# Patient Record
Sex: FEMALE | Race: OTHER | HISPANIC OR LATINO
[De-identification: names, ages, dates, MRNs, and addresses within clinical notes are randomized per-mention and may not be internally consistent; named-entity substitution may affect disease eponyms.]

---

## 2017-01-05 ENCOUNTER — APPOINTMENT (OUTPATIENT)
Dept: SURGERY | Facility: CLINIC | Age: 72
End: 2017-01-05

## 2017-01-05 VITALS
BODY MASS INDEX: 29.37 KG/M2 | DIASTOLIC BLOOD PRESSURE: 82 MMHG | HEIGHT: 64 IN | SYSTOLIC BLOOD PRESSURE: 132 MMHG | HEART RATE: 66 BPM | WEIGHT: 172 LBS

## 2017-01-05 DIAGNOSIS — Z78.9 OTHER SPECIFIED HEALTH STATUS: ICD-10-CM

## 2017-01-05 DIAGNOSIS — Z86.79 PERSONAL HISTORY OF OTHER DISEASES OF THE CIRCULATORY SYSTEM: ICD-10-CM

## 2018-06-12 ENCOUNTER — INPATIENT (INPATIENT)
Facility: HOSPITAL | Age: 73
LOS: 2 days | Discharge: HOME | End: 2018-06-15
Attending: INTERNAL MEDICINE | Admitting: INTERNAL MEDICINE
Payer: MEDICAID

## 2018-06-12 VITALS
OXYGEN SATURATION: 100 % | DIASTOLIC BLOOD PRESSURE: 91 MMHG | HEART RATE: 61 BPM | RESPIRATION RATE: 18 BRPM | SYSTOLIC BLOOD PRESSURE: 183 MMHG | TEMPERATURE: 98 F

## 2018-06-12 DIAGNOSIS — I21.4 NON-ST ELEVATION (NSTEMI) MYOCARDIAL INFARCTION: ICD-10-CM

## 2018-06-12 DIAGNOSIS — Z98.89 OTHER SPECIFIED POSTPROCEDURAL STATES: Chronic | ICD-10-CM

## 2018-06-12 LAB
ALBUMIN SERPL ELPH-MCNC: 4.4 G/DL — SIGNIFICANT CHANGE UP (ref 3.3–5)
ALP SERPL-CCNC: 154 U/L — HIGH (ref 40–120)
ALT FLD-CCNC: 26 U/L — SIGNIFICANT CHANGE UP (ref 4–33)
APTT BLD: 34.5 SEC — SIGNIFICANT CHANGE UP (ref 27.5–37.4)
AST SERPL-CCNC: 96 U/L — HIGH (ref 4–32)
BASE EXCESS BLDV CALC-SCNC: 2.4 MMOL/L — SIGNIFICANT CHANGE UP
BASOPHILS # BLD AUTO: 0.01 K/UL — SIGNIFICANT CHANGE UP (ref 0–0.2)
BASOPHILS NFR BLD AUTO: 0.1 % — SIGNIFICANT CHANGE UP (ref 0–2)
BILIRUB SERPL-MCNC: 0.6 MG/DL — SIGNIFICANT CHANGE UP (ref 0.2–1.2)
BLD GP AB SCN SERPL QL: NEGATIVE — SIGNIFICANT CHANGE UP
BLOOD GAS VENOUS - CREATININE: 0.63 MG/DL — SIGNIFICANT CHANGE UP (ref 0.5–1.3)
BUN SERPL-MCNC: 9 MG/DL — SIGNIFICANT CHANGE UP (ref 7–23)
CALCIUM SERPL-MCNC: 10 MG/DL — SIGNIFICANT CHANGE UP (ref 8.4–10.5)
CHLORIDE BLDV-SCNC: 95 MMOL/L — LOW (ref 96–108)
CHLORIDE SERPL-SCNC: 84 MMOL/L — LOW (ref 98–107)
CK MB BLD-MCNC: 10.5 — HIGH (ref 0–2.5)
CK MB BLD-MCNC: 97.35 NG/ML — HIGH (ref 1–4.7)
CK SERPL-CCNC: 926 U/L — HIGH (ref 25–170)
CO2 SERPL-SCNC: 26 MMOL/L — SIGNIFICANT CHANGE UP (ref 22–31)
CREAT SERPL-MCNC: 0.61 MG/DL — SIGNIFICANT CHANGE UP (ref 0.5–1.3)
EOSINOPHIL # BLD AUTO: 0 K/UL — SIGNIFICANT CHANGE UP (ref 0–0.5)
EOSINOPHIL NFR BLD AUTO: 0 % — SIGNIFICANT CHANGE UP (ref 0–6)
GAS PNL BLDV: 125 MMOL/L — LOW (ref 136–146)
GLUCOSE BLDC GLUCOMTR-MCNC: 133 MG/DL — HIGH (ref 70–99)
GLUCOSE BLDV-MCNC: 170 — HIGH (ref 70–99)
GLUCOSE SERPL-MCNC: 170 MG/DL — HIGH (ref 70–99)
HBA1C BLD-MCNC: 6 % — HIGH (ref 4–5.6)
HCO3 BLDV-SCNC: 26 MMOL/L — SIGNIFICANT CHANGE UP (ref 20–27)
HCT VFR BLD CALC: 40 % — SIGNIFICANT CHANGE UP (ref 34.5–45)
HCT VFR BLD CALC: 40.9 % — SIGNIFICANT CHANGE UP (ref 34.5–45)
HCT VFR BLDV CALC: 41.5 % — SIGNIFICANT CHANGE UP (ref 34.5–45)
HGB BLD-MCNC: 13.2 G/DL — SIGNIFICANT CHANGE UP (ref 11.5–15.5)
HGB BLD-MCNC: 13.4 G/DL — SIGNIFICANT CHANGE UP (ref 11.5–15.5)
HGB BLDV-MCNC: 13.5 G/DL — SIGNIFICANT CHANGE UP (ref 11.5–15.5)
IMM GRANULOCYTES # BLD AUTO: 0.04 # — SIGNIFICANT CHANGE UP
IMM GRANULOCYTES NFR BLD AUTO: 0.4 % — SIGNIFICANT CHANGE UP (ref 0–1.5)
INR BLD: 1.16 — SIGNIFICANT CHANGE UP (ref 0.88–1.17)
LACTATE BLDV-MCNC: 2.5 MMOL/L — HIGH (ref 0.5–2)
LIDOCAIN IGE QN: 41.4 U/L — SIGNIFICANT CHANGE UP (ref 7–60)
LYMPHOCYTES # BLD AUTO: 0.83 K/UL — LOW (ref 1–3.3)
LYMPHOCYTES # BLD AUTO: 7.7 % — LOW (ref 13–44)
MCHC RBC-ENTMCNC: 26.3 PG — LOW (ref 27–34)
MCHC RBC-ENTMCNC: 26.7 PG — LOW (ref 27–34)
MCHC RBC-ENTMCNC: 32.8 % — SIGNIFICANT CHANGE UP (ref 32–36)
MCHC RBC-ENTMCNC: 33 % — SIGNIFICANT CHANGE UP (ref 32–36)
MCV RBC AUTO: 80.2 FL — SIGNIFICANT CHANGE UP (ref 80–100)
MCV RBC AUTO: 80.8 FL — SIGNIFICANT CHANGE UP (ref 80–100)
MONOCYTES # BLD AUTO: 0.18 K/UL — SIGNIFICANT CHANGE UP (ref 0–0.9)
MONOCYTES NFR BLD AUTO: 1.7 % — LOW (ref 2–14)
NEUTROPHILS # BLD AUTO: 9.77 K/UL — HIGH (ref 1.8–7.4)
NEUTROPHILS NFR BLD AUTO: 90.1 % — HIGH (ref 43–77)
NRBC # FLD: 0 — SIGNIFICANT CHANGE UP
NRBC # FLD: 0 — SIGNIFICANT CHANGE UP
PCO2 BLDV: 39 MMHG — LOW (ref 41–51)
PH BLDV: 7.45 PH — HIGH (ref 7.32–7.43)
PLATELET # BLD AUTO: 315 K/UL — SIGNIFICANT CHANGE UP (ref 150–400)
PLATELET # BLD AUTO: 376 K/UL — SIGNIFICANT CHANGE UP (ref 150–400)
PMV BLD: 10.7 FL — SIGNIFICANT CHANGE UP (ref 7–13)
PMV BLD: 12.1 FL — SIGNIFICANT CHANGE UP (ref 7–13)
PO2 BLDV: 39 MMHG — SIGNIFICANT CHANGE UP (ref 35–40)
POTASSIUM BLDV-SCNC: 3.2 MMOL/L — LOW (ref 3.4–4.5)
POTASSIUM SERPL-MCNC: 4.9 MMOL/L — SIGNIFICANT CHANGE UP (ref 3.5–5.3)
POTASSIUM SERPL-SCNC: 4.9 MMOL/L — SIGNIFICANT CHANGE UP (ref 3.5–5.3)
PROT SERPL-MCNC: 9.3 G/DL — HIGH (ref 6–8.3)
PROTHROM AB SERPL-ACNC: 12.9 SEC — SIGNIFICANT CHANGE UP (ref 9.8–13.1)
RBC # BLD: 4.95 M/UL — SIGNIFICANT CHANGE UP (ref 3.8–5.2)
RBC # BLD: 5.1 M/UL — SIGNIFICANT CHANGE UP (ref 3.8–5.2)
RBC # FLD: 14 % — SIGNIFICANT CHANGE UP (ref 10.3–14.5)
RBC # FLD: 14.3 % — SIGNIFICANT CHANGE UP (ref 10.3–14.5)
RH IG SCN BLD-IMP: POSITIVE — SIGNIFICANT CHANGE UP
SAO2 % BLDV: 71.3 % — SIGNIFICANT CHANGE UP (ref 60–85)
SODIUM SERPL-SCNC: 127 MMOL/L — LOW (ref 135–145)
TROPONIN T SERPL-MCNC: 1.13 NG/ML — HIGH (ref 0–0.06)
TROPONIN T SERPL-MCNC: 1.38 NG/ML — HIGH (ref 0–0.06)
WBC # BLD: 10.83 K/UL — HIGH (ref 3.8–10.5)
WBC # BLD: 16.59 K/UL — HIGH (ref 3.8–10.5)
WBC # FLD AUTO: 10.83 K/UL — HIGH (ref 3.8–10.5)
WBC # FLD AUTO: 16.59 K/UL — HIGH (ref 3.8–10.5)

## 2018-06-12 PROCEDURE — 99222 1ST HOSP IP/OBS MODERATE 55: CPT

## 2018-06-12 PROCEDURE — 92941 PRQ TRLML REVSC TOT OCCL AMI: CPT | Mod: LD

## 2018-06-12 PROCEDURE — 92978 ENDOLUMINL IVUS OCT C 1ST: CPT | Mod: 26,LD

## 2018-06-12 PROCEDURE — 93458 L HRT ARTERY/VENTRICLE ANGIO: CPT | Mod: 26,59

## 2018-06-12 RX ORDER — CLOPIDOGREL BISULFATE 75 MG/1
75 TABLET, FILM COATED ORAL ONCE
Qty: 0 | Refills: 0 | Status: COMPLETED | OUTPATIENT
Start: 2018-06-12 | End: 2018-06-12

## 2018-06-12 RX ORDER — PANTOPRAZOLE SODIUM 20 MG/1
40 TABLET, DELAYED RELEASE ORAL ONCE
Qty: 0 | Refills: 0 | Status: COMPLETED | OUTPATIENT
Start: 2018-06-12 | End: 2018-06-12

## 2018-06-12 RX ORDER — DEXTROSE 50 % IN WATER 50 %
15 SYRINGE (ML) INTRAVENOUS ONCE
Qty: 0 | Refills: 0 | Status: DISCONTINUED | OUTPATIENT
Start: 2018-06-12 | End: 2018-06-13

## 2018-06-12 RX ORDER — CHLORHEXIDINE GLUCONATE 213 G/1000ML
1 SOLUTION TOPICAL
Qty: 0 | Refills: 0 | Status: DISCONTINUED | OUTPATIENT
Start: 2018-06-12 | End: 2018-06-15

## 2018-06-12 RX ORDER — ONDANSETRON 8 MG/1
4 TABLET, FILM COATED ORAL ONCE
Qty: 0 | Refills: 0 | Status: COMPLETED | OUTPATIENT
Start: 2018-06-12 | End: 2018-06-12

## 2018-06-12 RX ORDER — HEPARIN SODIUM 5000 [USP'U]/ML
INJECTION INTRAVENOUS; SUBCUTANEOUS
Qty: 25000 | Refills: 0 | Status: DISCONTINUED | OUTPATIENT
Start: 2018-06-12 | End: 2018-06-12

## 2018-06-12 RX ORDER — SODIUM CHLORIDE 9 MG/ML
1000 INJECTION, SOLUTION INTRAVENOUS
Qty: 0 | Refills: 0 | Status: DISCONTINUED | OUTPATIENT
Start: 2018-06-12 | End: 2018-06-13

## 2018-06-12 RX ORDER — NITROGLYCERIN 6.5 MG
1 CAPSULE, EXTENDED RELEASE ORAL ONCE
Qty: 0 | Refills: 0 | Status: DISCONTINUED | OUTPATIENT
Start: 2018-06-12 | End: 2018-06-12

## 2018-06-12 RX ORDER — GLUCAGON INJECTION, SOLUTION 0.5 MG/.1ML
1 INJECTION, SOLUTION SUBCUTANEOUS ONCE
Qty: 0 | Refills: 0 | Status: DISCONTINUED | OUTPATIENT
Start: 2018-06-12 | End: 2018-06-13

## 2018-06-12 RX ORDER — NITROGLYCERIN 6.5 MG
2 CAPSULE, EXTENDED RELEASE ORAL ONCE
Qty: 0 | Refills: 0 | Status: COMPLETED | OUTPATIENT
Start: 2018-06-12 | End: 2018-06-12

## 2018-06-12 RX ORDER — DEXTROSE 50 % IN WATER 50 %
25 SYRINGE (ML) INTRAVENOUS ONCE
Qty: 0 | Refills: 0 | Status: DISCONTINUED | OUTPATIENT
Start: 2018-06-12 | End: 2018-06-13

## 2018-06-12 RX ORDER — SODIUM CHLORIDE 9 MG/ML
1000 INJECTION INTRAMUSCULAR; INTRAVENOUS; SUBCUTANEOUS ONCE
Qty: 0 | Refills: 0 | Status: COMPLETED | OUTPATIENT
Start: 2018-06-12 | End: 2018-06-12

## 2018-06-12 RX ORDER — DEXTROSE 50 % IN WATER 50 %
12.5 SYRINGE (ML) INTRAVENOUS ONCE
Qty: 0 | Refills: 0 | Status: DISCONTINUED | OUTPATIENT
Start: 2018-06-12 | End: 2018-06-13

## 2018-06-12 RX ORDER — MORPHINE SULFATE 50 MG/1
2 CAPSULE, EXTENDED RELEASE ORAL ONCE
Qty: 0 | Refills: 0 | Status: DISCONTINUED | OUTPATIENT
Start: 2018-06-12 | End: 2018-06-12

## 2018-06-12 RX ORDER — NITROGLYCERIN 6.5 MG
0.4 CAPSULE, EXTENDED RELEASE ORAL ONCE
Qty: 0 | Refills: 0 | Status: DISCONTINUED | OUTPATIENT
Start: 2018-06-12 | End: 2018-06-12

## 2018-06-12 RX ORDER — SODIUM CHLORIDE 9 MG/ML
3 INJECTION INTRAMUSCULAR; INTRAVENOUS; SUBCUTANEOUS ONCE
Qty: 0 | Refills: 0 | Status: COMPLETED | OUTPATIENT
Start: 2018-06-12 | End: 2018-06-12

## 2018-06-12 RX ORDER — CLOPIDOGREL BISULFATE 75 MG/1
225 TABLET, FILM COATED ORAL ONCE
Qty: 0 | Refills: 0 | Status: COMPLETED | OUTPATIENT
Start: 2018-06-12 | End: 2018-06-12

## 2018-06-12 RX ORDER — HEPARIN SODIUM 5000 [USP'U]/ML
4000 INJECTION INTRAVENOUS; SUBCUTANEOUS EVERY 6 HOURS
Qty: 0 | Refills: 0 | Status: DISCONTINUED | OUTPATIENT
Start: 2018-06-12 | End: 2018-06-12

## 2018-06-12 RX ORDER — TICAGRELOR 90 MG/1
90 TABLET ORAL
Qty: 0 | Refills: 0 | Status: DISCONTINUED | OUTPATIENT
Start: 2018-06-12 | End: 2018-06-15

## 2018-06-12 RX ORDER — HYDRALAZINE HCL 50 MG
10 TABLET ORAL ONCE
Qty: 0 | Refills: 0 | Status: COMPLETED | OUTPATIENT
Start: 2018-06-12 | End: 2018-06-12

## 2018-06-12 RX ORDER — PANTOPRAZOLE SODIUM 20 MG/1
40 TABLET, DELAYED RELEASE ORAL
Qty: 0 | Refills: 0 | Status: DISCONTINUED | OUTPATIENT
Start: 2018-06-13 | End: 2018-06-15

## 2018-06-12 RX ORDER — INSULIN LISPRO 100/ML
VIAL (ML) SUBCUTANEOUS
Qty: 0 | Refills: 0 | Status: DISCONTINUED | OUTPATIENT
Start: 2018-06-12 | End: 2018-06-13

## 2018-06-12 RX ORDER — INSULIN LISPRO 100/ML
VIAL (ML) SUBCUTANEOUS AT BEDTIME
Qty: 0 | Refills: 0 | Status: DISCONTINUED | OUTPATIENT
Start: 2018-06-12 | End: 2018-06-13

## 2018-06-12 RX ORDER — ATORVASTATIN CALCIUM 80 MG/1
80 TABLET, FILM COATED ORAL AT BEDTIME
Qty: 0 | Refills: 0 | Status: DISCONTINUED | OUTPATIENT
Start: 2018-06-12 | End: 2018-06-15

## 2018-06-12 RX ORDER — NITROGLYCERIN 6.5 MG
0.4 CAPSULE, EXTENDED RELEASE ORAL ONCE
Qty: 0 | Refills: 0 | Status: COMPLETED | OUTPATIENT
Start: 2018-06-12 | End: 2018-06-12

## 2018-06-12 RX ORDER — PANTOPRAZOLE SODIUM 20 MG/1
40 TABLET, DELAYED RELEASE ORAL
Qty: 0 | Refills: 0 | Status: DISCONTINUED | OUTPATIENT
Start: 2018-06-12 | End: 2018-06-12

## 2018-06-12 RX ORDER — CLOPIDOGREL BISULFATE 75 MG/1
300 TABLET, FILM COATED ORAL ONCE
Qty: 0 | Refills: 0 | Status: COMPLETED | OUTPATIENT
Start: 2018-06-12 | End: 2018-06-12

## 2018-06-12 RX ORDER — HEPARIN SODIUM 5000 [USP'U]/ML
4000 INJECTION INTRAVENOUS; SUBCUTANEOUS ONCE
Qty: 0 | Refills: 0 | Status: COMPLETED | OUTPATIENT
Start: 2018-06-12 | End: 2018-06-12

## 2018-06-12 RX ORDER — HYDRALAZINE HCL 50 MG
25 TABLET ORAL ONCE
Qty: 0 | Refills: 0 | Status: COMPLETED | OUTPATIENT
Start: 2018-06-12 | End: 2018-06-12

## 2018-06-12 RX ORDER — CAPTOPRIL 12.5 MG/1
6.25 TABLET ORAL THREE TIMES A DAY
Qty: 0 | Refills: 0 | Status: DISCONTINUED | OUTPATIENT
Start: 2018-06-12 | End: 2018-06-14

## 2018-06-12 RX ADMIN — Medication 2 INCH(S): at 12:26

## 2018-06-12 RX ADMIN — ATORVASTATIN CALCIUM 80 MILLIGRAM(S): 80 TABLET, FILM COATED ORAL at 22:19

## 2018-06-12 RX ADMIN — Medication 25 MILLIGRAM(S): at 14:36

## 2018-06-12 RX ADMIN — PANTOPRAZOLE SODIUM 40 MILLIGRAM(S): 20 TABLET, DELAYED RELEASE ORAL at 20:18

## 2018-06-12 RX ADMIN — CLOPIDOGREL BISULFATE 75 MILLIGRAM(S): 75 TABLET, FILM COATED ORAL at 11:08

## 2018-06-12 RX ADMIN — CAPTOPRIL 6.25 MILLIGRAM(S): 12.5 TABLET ORAL at 22:19

## 2018-06-12 RX ADMIN — SODIUM CHLORIDE 1000 MILLILITER(S): 9 INJECTION INTRAMUSCULAR; INTRAVENOUS; SUBCUTANEOUS at 10:31

## 2018-06-12 RX ADMIN — CLOPIDOGREL BISULFATE 300 MILLIGRAM(S): 75 TABLET, FILM COATED ORAL at 12:08

## 2018-06-12 RX ADMIN — CLOPIDOGREL BISULFATE 225 MILLIGRAM(S): 75 TABLET, FILM COATED ORAL at 11:18

## 2018-06-12 RX ADMIN — HEPARIN SODIUM 4000 UNIT(S): 5000 INJECTION INTRAVENOUS; SUBCUTANEOUS at 12:14

## 2018-06-12 RX ADMIN — Medication 0.4 MILLIGRAM(S): at 11:16

## 2018-06-12 RX ADMIN — Medication 10 MILLIGRAM(S): at 14:36

## 2018-06-12 RX ADMIN — HEPARIN SODIUM 800 UNIT(S)/HR: 5000 INJECTION INTRAVENOUS; SUBCUTANEOUS at 12:15

## 2018-06-12 RX ADMIN — ONDANSETRON 4 MILLIGRAM(S): 8 TABLET, FILM COATED ORAL at 10:31

## 2018-06-12 RX ADMIN — SODIUM CHLORIDE 3 MILLILITER(S): 9 INJECTION INTRAMUSCULAR; INTRAVENOUS; SUBCUTANEOUS at 10:31

## 2018-06-12 RX ADMIN — MORPHINE SULFATE 2 MILLIGRAM(S): 50 CAPSULE, EXTENDED RELEASE ORAL at 10:32

## 2018-06-12 RX ADMIN — MORPHINE SULFATE 2 MILLIGRAM(S): 50 CAPSULE, EXTENDED RELEASE ORAL at 10:48

## 2018-06-12 RX ADMIN — ONDANSETRON 4 MILLIGRAM(S): 8 TABLET, FILM COATED ORAL at 20:18

## 2018-06-12 NOTE — ED PROVIDER NOTE - PHYSICAL EXAMINATION
ATTENDING PHYSICAL EXAM DR. RASMUSSEN ***GEN - NAD; well appearing; A+O x3 ***HEAD - NC/AT ***EYES/NOSE - PERRL, EOMI, mucous membranes moist, no discharge ***THROAT: Oral cavity and pharynx normal. No inflammation, swelling, exudate, or lesions.  ***NECK: Neck supple, non-tender without lymphadenopathy, no masses, no thyromegaly.   ***PULMONARY - CTA b/l, symmetric breath sounds. ***CARDIAC -s1s2, RRR, no M,G,R  ***ABDOMEN - +BS, ND, epigastric pain, soft, no guarding, no rebound, no masses   ***BACK - no CVA tenderness, Normal  spine ***EXTREMITIES - symmetric pulses, 2+ dp, capillary refill < 2 seconds, no clubbing, no cyanosis, no edema ***SKIN - no rash or bruising   ***NEUROLOGIC – alert

## 2018-06-12 NOTE — ED ADULT NURSE NOTE - CHIEF COMPLAINT QUOTE
# [id number] Presents with c/o abdominal pain with vomiting since yesterday with weakness.  Diarrhea since last night.

## 2018-06-12 NOTE — CONSULT NOTE ADULT - SUBJECTIVE AND OBJECTIVE BOX
Date of Consult: 6/12/2018    CHIEF COMPLAINT:    HISTORY OF PRESENT ILLNESS:  Tabitha Tsai is a 72 year old woman with PMHx significant for HTN, lung resection for lung ca in distant past who presents with epigastric pain for multiple hours. This started off mild with chest pain, which then moved down to epigastric area. She denies any SOB at all during this and she has never had this previously. She recently came here from [place] 2 weeks ago and her/family is unclear of her follow up. However, family does state that patient's mother passed away from a heart attack. Patient still admits to having this sort of discomfort.     BP check in the ED with 220/110 with EDUARDO 1 mm in leads V1 and V2 with repolarization changes as well. Cardiology called for evaluation.     Allergies    aspirin (Stomach Upset)  atenolol (Unknown)    Intolerances: aspirin: anaphylaxis     MEDICATIONS:  heparin  Infusion.  Unit(s)/Hr IV Continuous <Continuous>  heparin  Injectable 4000 Unit(s) IV Push every 6 hours PRN    PAST MEDICAL & SURGICAL HISTORY:  Lung mass  HTN (hypertension)  History of lung surgery    FAMILY HISTORY:  No pertinent family history in first degree relatives    SOCIAL HISTORY:    [ ] Non-smoker  [ ] Smoker  [ ] Alcohol    REVIEW OF SYSTEMS:  See HPI. Otherwise, 10 point ROS done and otherwise negative.    PHYSICAL EXAM:  T(C): 36.2 (06-12-18 @ 12:00), Max: 36.9 (06-12-18 @ 08:58)  HR: 63 (06-12-18 @ 12:00) (61 - 70)  BP: 199/92 (06-12-18 @ 12:00) (183/91 - 222/95)  RR: 18 (06-12-18 @ 12:00) (18 - 21)  SpO2: 100% (06-12-18 @ 12:00) (100% - 100%)  Wt(kg): --  I&O's Summary    Appearance: Normal	  HEENT:   Normal oral mucosa, PERRL, EOMI	  Lymphatic: No lymphadenopathy  Cardiovascular: Normal S1 S2, No JVD, No murmurs, No edema  Respiratory: Lungs clear to auscultation	  Psychiatry: A & O x 3, Mood & affect appropriate  Gastrointestinal:  Soft, Non-tender, + BS	  Skin: No rashes, No ecchymoses, No cyanosis	  Neurologic: Non-focal  Extremities: Normal range of motion, No clubbing, cyanosis or edema  Vascular: Peripheral pulses palpable 2+ bilaterally    LABS:	 	    CBC Full  -  ( 12 Jun 2018 09:50 )  WBC Count : 10.83 K/uL  Hemoglobin : 13.2 g/dL  Hematocrit : 40.0 %  Platelet Count - Automated : 376 K/uL  Mean Cell Volume : 80.8 fL  Mean Cell Hemoglobin : 26.7 pg  Mean Cell Hemoglobin Concentration : 33.0 %  Auto Neutrophil # : 9.77 K/uL  Auto Lymphocyte # : 0.83 K/uL  Auto Monocyte # : 0.18 K/uL  Auto Eosinophil # : 0.00 K/uL  Auto Basophil # : 0.01 K/uL  Auto Neutrophil % : 90.1 %  Auto Lymphocyte % : 7.7 %  Auto Monocyte % : 1.7 %  Auto Eosinophil % : 0.0 %  Auto Basophil % : 0.1 %    06-12    127<L>  |  84<L>  |  9   ----------------------------<  170<H>  4.9   |  26  |  0.61    Ca    10.0      12 Jun 2018 09:50    TPro  9.3<H>  /  Alb  4.4  /  TBili  0.6  /  DBili  x   /  AST  96<H>  /  ALT  26  /  AlkPhos  154<H>  06-12    proBNP:   Lipid Profile:   HgA1c:   TSH:     CARDIAC MARKERS:  	    ECG: Normal sinus with EDUARDO in V1 and V2 1 mm with repolarization changes.     	  RADIOLOGY:  OTHER: 	    PREVIOUS DIAGNOSTIC TESTING:    [ ] Echocardiogram:  [ ]  Catheterization:  [ ] Stress Test:  	  	  ASSESSMENT/PLAN:

## 2018-06-12 NOTE — CHART NOTE - NSCHARTNOTEFT_GEN_A_CORE
Rt radial band d/makeda. No hematoma or bleeding noted .Rt radial pulse +2 with good capillary refills to all fingers.4x4 gauze dsg  applied. RN notified to monitor for bleeding and hematoma.

## 2018-06-12 NOTE — H&P ADULT - NSHPREVIEWOFSYSTEMS_GEN_ALL_CORE
REVIEW OF SYSTEMS:    CONSTITUTIONAL: + chills  EYES/ENT: No visual changes;  No vertigo or throat pain   NECK: No pain or stiffness  RESPIRATORY: No cough, wheezing, hemoptysis; No shortness of breath  CARDIOVASCULAR: + chest pain  GASTROINTESTINAL: +nausea  GENITOURINARY: No dysuria, frequency or hematuria  NEUROLOGICAL: No numbness or weakness  SKIN: No itching, burning, rashes, or lesions   All other review of systems is negative unless indicated above.

## 2018-06-12 NOTE — H&P ADULT - HISTORY OF PRESENT ILLNESS
Tabitha Tsai is a 72 year old woman with PMHx significant for HTN, lung resection for lung ca in distant past who presents with epigastric pain for multiple hours. This started off mild with chest pain, which then moved down to epigastric area. She denies any SOB at all during this and she has never had this previously. She recently came here from [place] 2 weeks ago and her/family is unclear of her follow up. However, family does state that patient's mother passed away from a heart attack. Patient still admits to having this sort of discomfort.     BP check in the ED with 220/110 with EDUARDO 1 mm in leads V1 and V2 with repolarization changes as well.  Taken to the cath lab 2 ALEK to D1, found to have 80% mid RCA. Tabitha Tsai is a 72 year old woman with PMHx significant for HTN, lung resection for lung ca in distant past who presents with epigastric pain for multiple hours. This started off mild with chest pain, which then moved down to epigastric area. She denies any SOB at all during this and she has never had this previously. She recently came here from [place] 2 weeks ago and her/family is unclear of her follow up. However, family does state that patient's mother passed away from a heart attack. Patient still admits to having this sort of discomfort.     BP check in the ED with 220/110 with EDUARDO? 1 mm in leads V1 and V2 with repolarization changes as well.  Taken to the cath lab 2 ALEK to D1, found to have 80% mid RCA.

## 2018-06-12 NOTE — ED ADULT NURSE NOTE - OBJECTIVE STATEMENT
Patient received to room 12, A&Ox3, ambulatory, respirations even and unlabored, skin warm and dry good for color, appears uncomfortable. Patient reports yesterday after 4 pm experienced, nausea, vomiting, diarrhea, chest and abdominal pain after drinking a watermelon milkshake, reports no improvement of symptoms. Patient states 4 soft and formed BS's with no blood in stool, unsure how many times vomited. At time, patient has not had an episode of emesis. Denies LOC, hematuria, dysuria. Hx HT, HDL, Lung CA on remission x 3 years. Left Ac 20g IV placed, labs drawn and sent. On cardiac monitor, SR. Family at bedside. Patient received to room 12, A&Ox3, ambulatory, respirations even and unlabored, skin warm and dry good for color, appears uncomfortable. Patient reports yesterday after 4 pm experienced, nausea, vomiting, diarrhea, chest and abdominal pain after drinking a watermelon milkshake, reports no improvement of symptoms. Patient states 4 soft and formed BS's with no blood in stool, unsure how many times vomited. At time, patient has not had an episode of emesis. Denies LOC, hematuria, dysuria. Hx HT, HDL, Lung CA on remission x 3 years. Left Ac 20g IV placed, labs drawn and sent. Family at bedside.

## 2018-06-12 NOTE — H&P ADULT - ASSESSMENT
72 year old woman with PMHx significant for HTN, lung resection for lung ca who presents with epigastric pain. Presentation is concerning for UA given EKG changes, trop elevation, elevated JEFF but potentially in setting hypertensive emergency which can also cause her repol changes. S/p Cath with 2 ALEK to D1, found to also have 80% occlusion mid RCA.  Admitted to CCU for monitoring post cath and aspirin desensitization.    #Neuro: no issues, mentating well    #Cards:  -s/p cath, on plavix and statin, will desensitize to aspirin in the morning.      #Endo:  Unclear hx of diabetes, will cover with ISS for now and f/u A1c in morning    #GI:  Patient complaining of epigastric pain and nausea, will CTM    #Heme  -HH stable currently, will monitor cath site for signs of bleeding    PPX: Holding off on DVt ppx status post cath 72 year old woman with PMHx significant for HTN, lung resection for lung ca who presents with epigastric pain. Presentation is concerning for UA given EKG changes, trop elevation, elevated JEFF but potentially in setting hypertensive emergency which can also cause her repol changes. S/p Cath with 2 ALEK to D1, found to also have 80% occlusion mid RCA.  Admitted to CCU for monitoring post cath and aspirin desensitization.    #Neuro: no issues, mentating well    #Cards:  -s/p cath, on plavix and statin, will desensitize to aspirin in the morning.      #Endo:  Unclear hx of diabetes, will cover with ISS for now and f/u A1c in morning    #GI:  Patient complaining of epigastric pain and nausea, will CTM  -zofran prn    #Heme  -HH stable currently, will monitor cath site for signs of bleeding    PPX: Holding off on DVt ppx status post cath 72 year old woman with PMHx significant for HTN, lung resection for lung ca who presents with epigastric pain. Presentation is concerning for UA given EKG changes, trop elevation, elevated JEFF but potentially in setting hypertensive emergency which can also cause her repol changes. S/p Cath with 2 ALEK to D1, found to also have 80% occlusion mid RCA.  Admitted to CCU for monitoring post cath and aspirin desensitization.    #Neuro: no issues, mentating well    #Cards:  -s/p cath, received plavix in ED, will continue on ticagrelor and statin, will desensitize to aspirin in the morning.    -if worsening abdominal pain low threshold to get repeat EKG, enzymes    HTN  -captopril 6.25 TID    #Endo:  Unclear hx of diabetes, will cover with ISS for now and f/u A1c in morning    #GI:  Patient complaining of epigastric pain and nausea, will CTM  -zofran prn    #Heme  -HH stable currently, will monitor cath site for signs of bleeding    PPX: Holding off on DVt ppx status post cath

## 2018-06-12 NOTE — ED PROVIDER NOTE - MEDICAL DECISION MAKING DETAILS
72 YOF PMH lung CA s/p resection, HTN, DM p/w chest pain and epigastric pain for the past 16 hours.  Associated NB NB emesis, chills, and diarrhea.  Exam reveals epigastric tenderness.  DDX obstruction, atypical ACS, GERD, gastritis, enteritis.  CT AP r/o obstruction, serial enzymes, troponin r/o ACS, lipase r/o pancreatitis, CXR evaluate for life threatening causes of CP, metabolic and hematologic evaluation for organ dysfunction, symptomatic treatment, resuscitation with IVF, treat symptomatically, reassess.

## 2018-06-12 NOTE — H&P ADULT - NSHPPHYSICALEXAM_GEN_ALL_CORE
General: WN/WD NAD  Neurology: A&Ox3, nonfocal, AMBRIZ x 4  Eyes: PERRLA/ EOMI, Gross vision intact  ENT/Neck: Neck supple, trachea midline, No JVD, Gross hearing intact  Respiratory: CTA B/L, No wheezing, rales, rhonchi  CV: RRR, S1S2, no murmurs, rubs or gallops  Abdominal: Soft, TTP in epigastric area, ND +BS, +  Extremities: No edema, + peripheral pulses  Skin: No Rashes, Hematoma, Ecchymosis  MSK: 5/5 strength

## 2018-06-12 NOTE — ED PROVIDER NOTE - OBJECTIVE STATEMENT
72 YOF PMH lung CA s/p resection, HTN, DM p/w chest pain and epigastric pain for the past 16 hours.  Associated NB NB emesis, chills, and diarrhea.  Pt at dinner complained of chest pain radiating to her epigastric area.  Pt then vomited.  No shortness of breath, no worsening symptoms with exertion, no improvement with rest, no diaphoresis, no headache.  No history of stress test, no angiogram, no echocardiogram, and no follow up with a cardiologist. 72 YOF PMH lung CA s/p resection, HTN, DM p/w chest pain and epigastric pain for the past 16 hours.  Associated NB NB emesis, chills, and diarrhea.  Pt at dinner complained of chest pain radiating to her epigastric area.  Pt then vomited.  No shortness of breath, no worsening symptoms with exertion, no improvement with rest, no diaphoresis, no headache.  No history of stress test, no angiogram, no echocardiogram, and no follow up with a cardiologist. ATTG NOTE DR. RASMUSSEN   Site/Location - epigastric pain  Intensity / Severity - 6/10  Quality / Character - sharp  Onset / Duration - yesterday 4p  Radiation - to back  Context - no SOB, no diaphoresis, non exertional, no palpitations, no recent travelling, no recent abx, no fever  Aggravating factors - none  Alleviating factors - none  Associated Signs and Symptoms - no fever, no chills, +nausea, +emesis multiple, +diarrhea non bloody multiple, no extremity pain

## 2018-06-12 NOTE — CONSULT NOTE ADULT - ASSESSMENT
72 year old woman with PMHx significant for HTN, lung resection for lung ca who presents with epigastric pain. Presentation is concerning for UA given EKG changes, trop elevation, elevated JEFF but potentially in setting hypertensive emergency which can also cause her repol changes.   -agree with clopidogrel load of 600 mg given intolerance to aspirin  -heparin gtt  -would bring down pressures with nitro gtt  -plan for cardiac catheterization to delineate her coronary anatomy   -will follow     Please call on call cardiology team at [phone] with any further questions.

## 2018-06-12 NOTE — ED ADULT TRIAGE NOTE - CHIEF COMPLAINT QUOTE
# [id number] Presents with c/o abdominal pain with vomiting since yesterday with weakness.  Diarrhea since last night.

## 2018-06-12 NOTE — ED PROVIDER NOTE - PROGRESS NOTE DETAILS
ATTG NOTE DR. RASMUSSEN Pt with chest pain last night, none since, currently chest pain free.  Will repeat EKG and call cardiology for consult. JW NSTEMI.  Positive trop.  PT CP fee.  EKG unchanged.  PT admitted for catheterization.  Discussed with cardiology fellow.

## 2018-06-12 NOTE — H&P ADULT - NSHPLABSRESULTS_GEN_ALL_CORE
13.2   10.83 )-----------( 376      ( 12 Jun 2018 09:50 )             40.0       06-12    127<L>  |  84<L>  |  9   ----------------------------<  170<H>  4.9   |  26  |  0.61    Ca    10.0      12 Jun 2018 09:50    TPro  9.3<H>  /  Alb  4.4  /  TBili  0.6  /  DBili  x   /  AST  96<H>  /  ALT  26  /  AlkPhos  154<H>  06-12                  PT/INR - ( 12 Jun 2018 10:06 )   PT: 12.9 SEC;   INR: 1.16          PTT - ( 12 Jun 2018 10:06 )  PTT:34.5 SEC    Lactate Trend      CARDIAC MARKERS ( 12 Jun 2018 09:50 )  x     / 1.13 ng/mL / x     / x     / x            CAPILLARY BLOOD GLUCOSE

## 2018-06-13 ENCOUNTER — TRANSCRIPTION ENCOUNTER (OUTPATIENT)
Age: 73
End: 2018-06-13

## 2018-06-13 DIAGNOSIS — R74.0 NONSPECIFIC ELEVATION OF LEVELS OF TRANSAMINASE AND LACTIC ACID DEHYDROGENASE [LDH]: ICD-10-CM

## 2018-06-13 DIAGNOSIS — I25.10 ATHEROSCLEROTIC HEART DISEASE OF NATIVE CORONARY ARTERY WITHOUT ANGINA PECTORIS: ICD-10-CM

## 2018-06-13 DIAGNOSIS — R10.13 EPIGASTRIC PAIN: ICD-10-CM

## 2018-06-13 DIAGNOSIS — R91.8 OTHER NONSPECIFIC ABNORMAL FINDING OF LUNG FIELD: ICD-10-CM

## 2018-06-13 DIAGNOSIS — I24.9 ACUTE ISCHEMIC HEART DISEASE, UNSPECIFIED: ICD-10-CM

## 2018-06-13 DIAGNOSIS — I10 ESSENTIAL (PRIMARY) HYPERTENSION: ICD-10-CM

## 2018-06-13 DIAGNOSIS — E87.1 HYPO-OSMOLALITY AND HYPONATREMIA: ICD-10-CM

## 2018-06-13 DIAGNOSIS — D72.829 ELEVATED WHITE BLOOD CELL COUNT, UNSPECIFIED: ICD-10-CM

## 2018-06-13 DIAGNOSIS — Z29.9 ENCOUNTER FOR PROPHYLACTIC MEASURES, UNSPECIFIED: ICD-10-CM

## 2018-06-13 LAB
ALBUMIN SERPL ELPH-MCNC: 3.6 G/DL — SIGNIFICANT CHANGE UP (ref 3.3–5)
ALP SERPL-CCNC: 131 U/L — HIGH (ref 40–120)
ALT FLD-CCNC: 27 U/L — SIGNIFICANT CHANGE UP (ref 4–33)
AST SERPL-CCNC: 113 U/L — HIGH (ref 4–32)
BASOPHILS # BLD AUTO: 0.03 K/UL — SIGNIFICANT CHANGE UP (ref 0–0.2)
BASOPHILS NFR BLD AUTO: 0.2 % — SIGNIFICANT CHANGE UP (ref 0–2)
BILIRUB SERPL-MCNC: 1.2 MG/DL — SIGNIFICANT CHANGE UP (ref 0.2–1.2)
BUN SERPL-MCNC: 10 MG/DL — SIGNIFICANT CHANGE UP (ref 7–23)
CALCIUM SERPL-MCNC: 9.3 MG/DL — SIGNIFICANT CHANGE UP (ref 8.4–10.5)
CHLORIDE SERPL-SCNC: 85 MMOL/L — LOW (ref 98–107)
CHOLEST SERPL-MCNC: 250 MG/DL — HIGH (ref 120–199)
CK MB BLD-MCNC: 59.49 NG/ML — HIGH (ref 1–4.7)
CK MB BLD-MCNC: 8.5 — HIGH (ref 0–2.5)
CK MB BLD-MCNC: 80.96 NG/ML — HIGH (ref 1–4.7)
CK SERPL-CCNC: 825 U/L — HIGH (ref 25–170)
CK SERPL-CCNC: 951 U/L — HIGH (ref 25–170)
CO2 SERPL-SCNC: 24 MMOL/L — SIGNIFICANT CHANGE UP (ref 22–31)
CREAT SERPL-MCNC: 0.65 MG/DL — SIGNIFICANT CHANGE UP (ref 0.5–1.3)
EOSINOPHIL # BLD AUTO: 0.01 K/UL — SIGNIFICANT CHANGE UP (ref 0–0.5)
EOSINOPHIL NFR BLD AUTO: 0.1 % — SIGNIFICANT CHANGE UP (ref 0–6)
GLUCOSE BLDC GLUCOMTR-MCNC: 139 MG/DL — HIGH (ref 70–99)
GLUCOSE SERPL-MCNC: 139 MG/DL — HIGH (ref 70–99)
HBA1C BLD-MCNC: 5.9 % — HIGH (ref 4–5.6)
HCT VFR BLD CALC: 38.7 % — SIGNIFICANT CHANGE UP (ref 34.5–45)
HCT VFR BLD CALC: 38.7 % — SIGNIFICANT CHANGE UP (ref 34.5–45)
HDLC SERPL-MCNC: 54 MG/DL — SIGNIFICANT CHANGE UP (ref 45–65)
HGB BLD-MCNC: 13.5 G/DL — SIGNIFICANT CHANGE UP (ref 11.5–15.5)
HGB BLD-MCNC: 13.5 G/DL — SIGNIFICANT CHANGE UP (ref 11.5–15.5)
IMM GRANULOCYTES # BLD AUTO: 0.07 # — SIGNIFICANT CHANGE UP
IMM GRANULOCYTES NFR BLD AUTO: 0.4 % — SIGNIFICANT CHANGE UP (ref 0–1.5)
LACTATE SERPL-SCNC: 3 MMOL/L — HIGH (ref 0.5–2)
LIPID PNL WITH DIRECT LDL SERPL: 192 MG/DL — SIGNIFICANT CHANGE UP
LYMPHOCYTES # BLD AUTO: 1.02 K/UL — SIGNIFICANT CHANGE UP (ref 1–3.3)
LYMPHOCYTES # BLD AUTO: 5.5 % — LOW (ref 13–44)
MAGNESIUM SERPL-MCNC: 1.7 MG/DL — SIGNIFICANT CHANGE UP (ref 1.6–2.6)
MCHC RBC-ENTMCNC: 26.3 PG — LOW (ref 27–34)
MCHC RBC-ENTMCNC: 26.3 PG — LOW (ref 27–34)
MCHC RBC-ENTMCNC: 34.9 % — SIGNIFICANT CHANGE UP (ref 32–36)
MCHC RBC-ENTMCNC: 34.9 % — SIGNIFICANT CHANGE UP (ref 32–36)
MCV RBC AUTO: 75.4 FL — LOW (ref 80–100)
MCV RBC AUTO: 75.4 FL — LOW (ref 80–100)
MONOCYTES # BLD AUTO: 1.28 K/UL — HIGH (ref 0–0.9)
MONOCYTES NFR BLD AUTO: 6.9 % — SIGNIFICANT CHANGE UP (ref 2–14)
NEUTROPHILS # BLD AUTO: 16.01 K/UL — HIGH (ref 1.8–7.4)
NEUTROPHILS NFR BLD AUTO: 86.9 % — HIGH (ref 43–77)
NRBC # FLD: 0 — SIGNIFICANT CHANGE UP
NRBC # FLD: 0 — SIGNIFICANT CHANGE UP
OSMOLALITY SERPL: 266 MOSMO/KG — LOW (ref 275–295)
OSMOLALITY UR: 92 MOSMO/KG — SIGNIFICANT CHANGE UP (ref 50–1200)
PHOSPHATE SERPL-MCNC: 3.2 MG/DL — SIGNIFICANT CHANGE UP (ref 2.5–4.5)
PLATELET # BLD AUTO: 384 K/UL — SIGNIFICANT CHANGE UP (ref 150–400)
PLATELET # BLD AUTO: 384 K/UL — SIGNIFICANT CHANGE UP (ref 150–400)
PMV BLD: 9.9 FL — SIGNIFICANT CHANGE UP (ref 7–13)
PMV BLD: 9.9 FL — SIGNIFICANT CHANGE UP (ref 7–13)
POTASSIUM SERPL-MCNC: 3.1 MMOL/L — LOW (ref 3.5–5.3)
POTASSIUM SERPL-SCNC: 3.1 MMOL/L — LOW (ref 3.5–5.3)
PROT SERPL-MCNC: 8.1 G/DL — SIGNIFICANT CHANGE UP (ref 6–8.3)
RBC # BLD: 5.13 M/UL — SIGNIFICANT CHANGE UP (ref 3.8–5.2)
RBC # BLD: 5.13 M/UL — SIGNIFICANT CHANGE UP (ref 3.8–5.2)
RBC # FLD: 14.6 % — HIGH (ref 10.3–14.5)
RBC # FLD: 14.6 % — HIGH (ref 10.3–14.5)
SODIUM SERPL-SCNC: 126 MMOL/L — LOW (ref 135–145)
SODIUM UR-SCNC: < 20 MMOL/L — SIGNIFICANT CHANGE UP
T3 SERPL-MCNC: 88.8 NG/DL — SIGNIFICANT CHANGE UP (ref 80–200)
T4 AB SER-ACNC: 8.68 UG/DL — SIGNIFICANT CHANGE UP (ref 5.1–13)
TRIGL SERPL-MCNC: 79 MG/DL — SIGNIFICANT CHANGE UP (ref 10–149)
TROPONIN T SERPL-MCNC: 1.86 NG/ML — HIGH (ref 0–0.06)
TROPONIN T, HIGH SENSITIVITY RESULT: 1462 NG/L — CRITICAL HIGH (ref ?–14)
TSH SERPL-MCNC: 3.25 UIU/ML — SIGNIFICANT CHANGE UP (ref 0.27–4.2)
WBC # BLD: 18.34 K/UL — HIGH (ref 3.8–10.5)
WBC # BLD: 18.34 K/UL — HIGH (ref 3.8–10.5)
WBC # FLD AUTO: 18.34 K/UL — HIGH (ref 3.8–10.5)
WBC # FLD AUTO: 18.34 K/UL — HIGH (ref 3.8–10.5)

## 2018-06-13 PROCEDURE — 93306 TTE W/DOPPLER COMPLETE: CPT | Mod: 26

## 2018-06-13 PROCEDURE — 99223 1ST HOSP IP/OBS HIGH 75: CPT

## 2018-06-13 PROCEDURE — 99233 SBSQ HOSP IP/OBS HIGH 50: CPT

## 2018-06-13 RX ORDER — POTASSIUM CHLORIDE 20 MEQ
40 PACKET (EA) ORAL EVERY 4 HOURS
Qty: 0 | Refills: 0 | Status: COMPLETED | OUTPATIENT
Start: 2018-06-13 | End: 2018-06-13

## 2018-06-13 RX ORDER — MAGNESIUM SULFATE 500 MG/ML
2 VIAL (ML) INJECTION ONCE
Qty: 0 | Refills: 0 | Status: COMPLETED | OUTPATIENT
Start: 2018-06-13 | End: 2018-06-13

## 2018-06-13 RX ORDER — ASPIRIN/CALCIUM CARB/MAGNESIUM 324 MG
81 TABLET ORAL DAILY
Qty: 0 | Refills: 0 | Status: DISCONTINUED | OUTPATIENT
Start: 2018-06-13 | End: 2018-06-15

## 2018-06-13 RX ORDER — METOPROLOL TARTRATE 50 MG
12.5 TABLET ORAL
Qty: 0 | Refills: 0 | Status: DISCONTINUED | OUTPATIENT
Start: 2018-06-13 | End: 2018-06-14

## 2018-06-13 RX ADMIN — Medication 81 MILLIGRAM(S): at 12:27

## 2018-06-13 RX ADMIN — Medication 40 MILLIEQUIVALENT(S): at 09:26

## 2018-06-13 RX ADMIN — PANTOPRAZOLE SODIUM 40 MILLIGRAM(S): 20 TABLET, DELAYED RELEASE ORAL at 09:25

## 2018-06-13 RX ADMIN — Medication 12.5 MILLIGRAM(S): at 12:27

## 2018-06-13 RX ADMIN — CAPTOPRIL 6.25 MILLIGRAM(S): 12.5 TABLET ORAL at 21:10

## 2018-06-13 RX ADMIN — Medication 50 GRAM(S): at 07:26

## 2018-06-13 RX ADMIN — CAPTOPRIL 6.25 MILLIGRAM(S): 12.5 TABLET ORAL at 14:03

## 2018-06-13 RX ADMIN — Medication 2 INCH(S): at 00:00

## 2018-06-13 RX ADMIN — CHLORHEXIDINE GLUCONATE 1 APPLICATION(S): 213 SOLUTION TOPICAL at 14:05

## 2018-06-13 RX ADMIN — Medication 40 MILLIEQUIVALENT(S): at 14:02

## 2018-06-13 RX ADMIN — TICAGRELOR 90 MILLIGRAM(S): 90 TABLET ORAL at 06:18

## 2018-06-13 RX ADMIN — CAPTOPRIL 6.25 MILLIGRAM(S): 12.5 TABLET ORAL at 06:18

## 2018-06-13 RX ADMIN — ATORVASTATIN CALCIUM 80 MILLIGRAM(S): 80 TABLET, FILM COATED ORAL at 21:10

## 2018-06-13 RX ADMIN — TICAGRELOR 90 MILLIGRAM(S): 90 TABLET ORAL at 19:14

## 2018-06-13 RX ADMIN — Medication 12.5 MILLIGRAM(S): at 21:10

## 2018-06-13 NOTE — DISCHARGE NOTE ADULT - CARE PROVIDER_API CALL
Azalea Cisneros  [facility] [place] [address]  Phone: [phone]  Fax: (   )    -

## 2018-06-13 NOTE — PROGRESS NOTE ADULT - PROBLEM SELECTOR PLAN 7
Lopressor 12.5 bid started, c/w captopril  Monitor BP, titrate antihypertensive regimen accordingly  Low Na diet

## 2018-06-13 NOTE — PROGRESS NOTE ADULT - ASSESSMENT
72 year old woman with PMHx significant for HTN, lung resection for lung ca who presents with epigastric pain. Presentation is concerning for UA given EKG changes, trop elevation, elevated JEFF but potentially in setting hypertensive emergency which can also cause her repol changes. S/p Cath with 2 ALEK to D1, found to also have 80% occlusion mid RCA.  Admitted to CCU for monitoring post cath and aspirin desensitization. 72 year old woman with PMHx significant for HTN, lung resection for lung ca who presents with epigastric pain. Presentation is concerning for UA given EKG changes, trop elevation, elevated JEFF but potentially in setting hypertensive emergency which can also cause her repol changes. S/p Cath with 2 ALEK to D1, found to also have 80% occlusion mid RCA.  Admitted to CCU for monitoring post cath, pending staged cath.

## 2018-06-13 NOTE — DISCHARGE NOTE ADULT - NS AS ACTIVITY OBS
Walking-Outdoors allowed/Showering allowed/Bathing allowed/Walking-Indoors allowed/No Heavy lifting/straining/Stairs allowed

## 2018-06-13 NOTE — PROGRESS NOTE ADULT - SUBJECTIVE AND OBJECTIVE BOX
Patient is a 72y old  Female who presents with a chief complaint of     SUBJECTIVE / OVERNIGHT EVENTS:  Patient seen and examined at bedside. CKMB downtrending, CK and trop still trending up. This AM, patient feels well. Denies fever, diaphoresis, lightheadedness, SOB, CP, palpitations, nausea, and abdominal pain.     ROS:  See SUBJECTIVE / OVERNIGHT EVENTS    MEDICATIONS  (STANDING):  atorvastatin 80 milliGRAM(s) Oral at bedtime  captopril 6.25 milliGRAM(s) Oral three times a day  chlorhexidine 4% Liquid 1 Application(s) Topical <User Schedule>  dextrose 5%. 1000 milliLiter(s) (50 mL/Hr) IV Continuous <Continuous>  dextrose 50% Injectable 12.5 Gram(s) IV Push once  dextrose 50% Injectable 25 Gram(s) IV Push once  dextrose 50% Injectable 25 Gram(s) IV Push once  insulin lispro (HumaLOG) corrective regimen sliding scale   SubCutaneous three times a day before meals  insulin lispro (HumaLOG) corrective regimen sliding scale   SubCutaneous at bedtime  magnesium sulfate  IVPB 2 Gram(s) IV Intermittent once  ondansetron Injectable 4 milliGRAM(s) IV Push once  pantoprazole    Tablet 40 milliGRAM(s) Oral before breakfast  potassium chloride    Tablet ER 40 milliEquivalent(s) Oral every 4 hours  ticagrelor 90 milliGRAM(s) Oral two times a day    MEDICATIONS  (PRN):  dextrose 40% Gel 15 Gram(s) Oral once PRN Blood Glucose LESS THAN 70 milliGRAM(s)/deciliter  glucagon  Injectable 1 milliGRAM(s) IntraMuscular once PRN Glucose LESS THAN 70 milligrams/deciliter      Vital Signs Last 24 Hrs  T(C): 36.9 (13 Jun 2018 04:00), Max: 36.9 (12 Jun 2018 08:58)  T(F): 98.4 (13 Jun 2018 04:00), Max: 98.5 (12 Jun 2018 08:58)  HR: 78 (13 Jun 2018 08:00) (61 - 81)  BP: 152/77 (13 Jun 2018 08:00) (150/68 - 222/95)  BP(mean): 97 (13 Jun 2018 08:00) (89 - 105)  RR: 19 (13 Jun 2018 08:00) (13 - 21)  SpO2: 98% (13 Jun 2018 08:00) (97% - 100%)  CAPILLARY BLOOD GLUCOSE      POCT Blood Glucose.: 133 mg/dL (12 Jun 2018 22:07)    I&O's Summary    12 Jun 2018 07:01  -  13 Jun 2018 07:00  --------------------------------------------------------  IN: 320 mL / OUT: 500 mL / NET: -180 mL        PHYSICAL EXAM:  GENERAL: NAD, well-developed  HEAD: Atraumatic, Normocephalic  EYES: EOMI, conjunctiva and sclera clear  NECK: Supple, no LAD  CHEST/LUNG: Breath sounds CTA b/l; No wheeze  HEART: RRR, S1 and S2 present; No murmurs  ABDOMEN: Soft, NT, ND; + Bowel sounds  EXTREMITIES: No clubbing, cyanosis, or edema  VASCULAR: Peripheral pulses 2+ b/l  NEUROLOGY: No focal deficits  SKIN: No rashes or lesions    LABS:                        13.5   18.34 )-----------( 384      ( 13 Jun 2018 05:15 )             38.7     WBC Trend: 18.34<--, 16.59<--, 10.83<--  06-13    126<L>  |  85<L>  |  10  ----------------------------<  139<H>  3.1<L>   |  24  |  0.65    Ca    9.3      13 Jun 2018 05:15  Phos  3.2     06-13  Mg     1.7     06-13    TPro  8.1  /  Alb  3.6  /  TBili  1.2  /  DBili  x   /  AST  113<H>  /  ALT  27  /  AlkPhos  131<H>  06-13    Creatinine Trend: 0.65<--, 0.61<--  PT/INR - ( 12 Jun 2018 10:06 )   PT: 12.9 SEC;   INR: 1.16          PTT - ( 12 Jun 2018 10:06 )  PTT:34.5 SEC  CARDIAC MARKERS ( 13 Jun 2018 05:15 )  x     / 1.86 ng/mL / 951 u/L / 80.96 ng/mL / x      CARDIAC MARKERS ( 12 Jun 2018 20:00 )  x     / 1.38 ng/mL / 926 u/L / 97.35 ng/mL / x      CARDIAC MARKERS ( 12 Jun 2018 09:50 )  x     / 1.13 ng/mL / x     / x     / x        Lipid Profile in AM (06.13.18 @ 05:15)    Cholesterol, Serum: 250 mg/dL    Triglycerides, Serum: 79 mg/dL    HDL Cholesterol, Serum: 54 mg/dL    Direct LDL: 192:   RESULT (mg/dL)   (For adults 18 years and older)  ----------------------------------------------------------  Below 70         Ideal for people at very high risk of  heart disease  Below 100        Ideal for people at risk of heart disease  100 - 129        [place]  130 - 159        Borderline high  160 - 189        High  190 and above    Very high mg/dL    Hemoglobin A1C, Whole Blood (06.13.18 @ 05:15)    Hemoglobin A1C, Whole Blood: 5.9: High Risk (prediabetic)    5.7 - 6.4 %  Diabetic, diagnostic           > 6.5 %  ADA diabetic treatment goal    < 7.0 %    HbA1C values may not accurately reflect mean blood glucose  in patients with Hb variants.  Suggest clinical correlation. %    Blood Gas Venous Comprehensive (06.12.18 @ 10:00)    Blood Gas Venous - Lactate: 2.5: Please note updated reference range. mmol/L    Thyroid Stimulating Hormone, Serum (06.13.18 @ 05:15)    Thyroid Stimulating Hormone, Serum: 3.25 uIU/mL    Triiodothyronine, Total (T3 Total) (06.13.18 @ 05:15)    Triiodothyronine, Total (T3 Total): 88.8: NOTE: The total T3 units have changed from ng/mL to ng/dL.  Utilizing the new reference range has no expected impact on  clinical interpretation. ng/dL    T4, Serum (06.13.18 @ 05:15)    T4, Serum: 8.68 ug/dL    Lipase, Serum (06.12.18 @ 09:50)    Lipase, Serum: 41.4 U/L    RADIOLOGY & ADDITIONAL TESTS:    Imaging Personally Reviewed:    Consultant(s) Notes Reviewed:      Care Discussed with Consultants/Other Providers:    CONTACT #: [id number] Patient is a 72y old  Female who presents with a chief complaint of     SUBJECTIVE / OVERNIGHT EVENTS:  Patient seen and examined at bedside. CKMB downtrending, CK and trop still trending up. This AM, patient feels well. Denies fever, diaphoresis, lightheadedness, SOB, CP, palpitations, nausea, and abdominal pain. For staged cath this afternoon.    ROS:  See SUBJECTIVE / OVERNIGHT EVENTS    MEDICATIONS  (STANDING):  atorvastatin 80 milliGRAM(s) Oral at bedtime  captopril 6.25 milliGRAM(s) Oral three times a day  chlorhexidine 4% Liquid 1 Application(s) Topical <User Schedule>  dextrose 5%. 1000 milliLiter(s) (50 mL/Hr) IV Continuous <Continuous>  dextrose 50% Injectable 12.5 Gram(s) IV Push once  dextrose 50% Injectable 25 Gram(s) IV Push once  dextrose 50% Injectable 25 Gram(s) IV Push once  insulin lispro (HumaLOG) corrective regimen sliding scale   SubCutaneous three times a day before meals  insulin lispro (HumaLOG) corrective regimen sliding scale   SubCutaneous at bedtime  magnesium sulfate  IVPB 2 Gram(s) IV Intermittent once  ondansetron Injectable 4 milliGRAM(s) IV Push once  pantoprazole    Tablet 40 milliGRAM(s) Oral before breakfast  potassium chloride    Tablet ER 40 milliEquivalent(s) Oral every 4 hours  ticagrelor 90 milliGRAM(s) Oral two times a day    MEDICATIONS  (PRN):  dextrose 40% Gel 15 Gram(s) Oral once PRN Blood Glucose LESS THAN 70 milliGRAM(s)/deciliter  glucagon  Injectable 1 milliGRAM(s) IntraMuscular once PRN Glucose LESS THAN 70 milligrams/deciliter      Vital Signs Last 24 Hrs  T(C): 36.9 (13 Jun 2018 04:00), Max: 36.9 (12 Jun 2018 08:58)  T(F): 98.4 (13 Jun 2018 04:00), Max: 98.5 (12 Jun 2018 08:58)  HR: 78 (13 Jun 2018 08:00) (61 - 81)  BP: 152/77 (13 Jun 2018 08:00) (150/68 - 222/95)  BP(mean): 97 (13 Jun 2018 08:00) (89 - 105)  RR: 19 (13 Jun 2018 08:00) (13 - 21)  SpO2: 98% (13 Jun 2018 08:00) (97% - 100%)  CAPILLARY BLOOD GLUCOSE      POCT Blood Glucose.: 133 mg/dL (12 Jun 2018 22:07)    I&O's Summary    12 Jun 2018 07:01  -  13 Jun 2018 07:00  --------------------------------------------------------  IN: 320 mL / OUT: 500 mL / NET: -180 mL        PHYSICAL EXAM:  GENERAL: NAD, well-developed  HEAD: Atraumatic, Normocephalic  EYES: EOMI, conjunctiva and sclera clear  NECK: Supple, no LAD  CHEST/LUNG: Breath sounds CTA b/l; No wheeze  HEART: RRR, S1 and S2 present; No murmurs  ABDOMEN: Soft, NT, ND; + Bowel sounds  EXTREMITIES: No clubbing, cyanosis, or edema  VASCULAR: Peripheral pulses 2+ b/l  NEUROLOGY: No focal deficits  SKIN: No rashes or lesions    LABS:                        13.5   18.34 )-----------( 384      ( 13 Jun 2018 05:15 )             38.7     WBC Trend: 18.34<--, 16.59<--, 10.83<--  06-13    126<L>  |  85<L>  |  10  ----------------------------<  139<H>  3.1<L>   |  24  |  0.65    Ca    9.3      13 Jun 2018 05:15  Phos  3.2     06-13  Mg     1.7     06-13    TPro  8.1  /  Alb  3.6  /  TBili  1.2  /  DBili  x   /  AST  113<H>  /  ALT  27  /  AlkPhos  131<H>  06-13    Creatinine Trend: 0.65<--, 0.61<--  PT/INR - ( 12 Jun 2018 10:06 )   PT: 12.9 SEC;   INR: 1.16          PTT - ( 12 Jun 2018 10:06 )  PTT:34.5 SEC  CARDIAC MARKERS ( 13 Jun 2018 05:15 )  x     / 1.86 ng/mL / 951 u/L / 80.96 ng/mL / x      CARDIAC MARKERS ( 12 Jun 2018 20:00 )  x     / 1.38 ng/mL / 926 u/L / 97.35 ng/mL / x      CARDIAC MARKERS ( 12 Jun 2018 09:50 )  x     / 1.13 ng/mL / x     / x     / x        Lipid Profile in AM (06.13.18 @ 05:15)    Cholesterol, Serum: 250 mg/dL    Triglycerides, Serum: 79 mg/dL    HDL Cholesterol, Serum: 54 mg/dL    Direct LDL: 192:   RESULT (mg/dL)   (For adults 18 years and older)  ----------------------------------------------------------  Below 70         Ideal for people at very high risk of  heart disease  Below 100        Ideal for people at risk of heart disease  100 - 129        [place]  130 - 159        Borderline high  160 - 189        High  190 and above    Very high mg/dL    Hemoglobin A1C, Whole Blood (06.13.18 @ 05:15)    Hemoglobin A1C, Whole Blood: 5.9: High Risk (prediabetic)    5.7 - 6.4 %  Diabetic, diagnostic           > 6.5 %  ADA diabetic treatment goal    < 7.0 %    HbA1C values may not accurately reflect mean blood glucose  in patients with Hb variants.  Suggest clinical correlation. %    Blood Gas Venous Comprehensive (06.12.18 @ 10:00)    Blood Gas Venous - Lactate: 2.5: Please note updated reference range. mmol/L    Thyroid Stimulating Hormone, Serum (06.13.18 @ 05:15)    Thyroid Stimulating Hormone, Serum: 3.25 uIU/mL    Triiodothyronine, Total (T3 Total) (06.13.18 @ 05:15)    Triiodothyronine, Total (T3 Total): 88.8: NOTE: The total T3 units have changed from ng/mL to ng/dL.  Utilizing the new reference range has no expected impact on  clinical interpretation. ng/dL    T4, Serum (06.13.18 @ 05:15)    T4, Serum: 8.68 ug/dL    Lipase, Serum (06.12.18 @ 09:50)    Lipase, Serum: 41.4 U/L    RADIOLOGY & ADDITIONAL TESTS:    Imaging Personally Reviewed:    Consultant(s) Notes Reviewed:      Care Discussed with Consultants/Other Providers:    CONTACT #: [id number]

## 2018-06-13 NOTE — PROGRESS NOTE ADULT - PROBLEM SELECTOR PLAN 9
DVT PPX: Holding pharmacological PPX as patient is pending DVT PPX: Holding pharmacological PPX as patient is pending staged cath this afternoon

## 2018-06-13 NOTE — DISCHARGE NOTE ADULT - PLAN OF CARE
remain painfree follow DASH/LF diet, take all meds as prescribed, and continue outpatient followup as instructed Please follow up with your primary care provider within 1 week of discharge as well as a cardiologist. You came to the hospital with upper abdominal pain and were found to have a non-ST segment elevation myocardial infarction. You underwent cardiac catheterization at which time _. Please limit dietary fat and salt consumption and take your medications as prescribed. Please seek medical attention if you develop chest pain, nausea, sweating, left arm, neck, or jaw pain. Please follow up with your primary care provider stefaniain one week of your discharge from the hospital as well as a cardiologist. Please follow up with your primary care provider within 1 week of discharge. You were found to have a low sodium level during your hospitalization. Please follow up with your primary care provider within one week of your discharge from the hospital and have your sodium level rechecked at that visit. You have a history of high blood pressure and your blood pressure was elevated when you first came to the hospital. Please limit dietary salt consumption and take your medications as prescribed. Please follow up with your primary care provider within one week of your discharge from the hospital as well as a cardiologist. You have a history of lung cancer and underwent surgical resection. Please follow up as needed on an outpatient basis. You were found to have a low sodium level during your hospitalization, likely due to volume overload. Please follow up with your primary care provider within one week of your discharge from the hospital and have your sodium level rechecked at that visit. You came to the hospital with upper abdominal pain and were found to have a non-ST segment elevation myocardial infarction. You underwent cardiac catheterization and had three drug eluting stents placed. Please limit dietary fat and salt consumption and take your medications as prescribed. Please seek medical attention if you develop chest pain, nausea, sweating, left arm, neck, or jaw pain. Please follow up with your primary care provider stefaniain one week of your discharge from the hospital as well as a cardiologist. You developed blurry vision with red and green floaters and subsequently developed a headache. CT scan of your head was performed which did not reveal a cause of your symptoms. These symptoms are likely due to ocular migraine. Please seek medical attention and have a repeat head CT scan or brain MRI if your symptoms recur or worsen. Please follow up with your primary care provider within one week of your discharge from the hospital.

## 2018-06-13 NOTE — PROGRESS NOTE ADULT - PROBLEM SELECTOR PLAN 1
Underwent LHC, found to have 80% RCA occlusion s/p stents x2 to D1  Loaded w/ plavix in ED and brilinta in cath lab  C/w brilinta and atorvastatin  Would start asa as patient does not have true asa allergy  Cardiac monitoring  Serial EKG for recurrent CP  Monitor Nik Underwent LHC, found to have 80% RCA occlusion s/p stents x2 to D1, for staged cath this afternoon  Loaded w/ plavix in ED and brilinta in cath lab  C/w brilinta and atorvastatin  Asa started as patient does not have true asa allergy  Ordered for TTE  Cardiac monitoring  Serial EKG for recurrent CP  Monitor Nik Underwent LHC, found to have 80% RCA occlusion s/p ALEK x2 to D1, for staged cath this afternoon  Loaded w/ plavix in ED and brilinta in cath lab  C/w brilinta and atorvastatin  Asa started as patient does not have true asa allergy  Lopressor 12.5 bid started  Ordered for TTE  Cardiac monitoring  Serial EKG for recurrent CP  Monitor Nik

## 2018-06-13 NOTE — CONSULT NOTE ADULT - PROBLEM SELECTOR RECOMMENDATION 9
Patient tolerates ASA 81mg without any problems and does not need desensitization. She gets abd pain with higher doses, which is a side effect not an allergy. ASA has been removed from allergy list.

## 2018-06-13 NOTE — CONSULT NOTE ADULT - ASSESSMENT
72 year old woman with PMHx significant for HTN, lung resection for lung ca in distant past who presents s/p stenting requiring ASA.

## 2018-06-13 NOTE — PROGRESS NOTE ADULT - PROBLEM SELECTOR PLAN 6
Advance Care Planning    Patient's Healthcare Decision Maker is: Named in scanned ACP document   Confirm Advance Directive Yes, on file      Reviewed the current advance care plan document on file with patient and all information is up to date. DVT PPX: Holding DVT PPX s/p cath Likely in setting of recent cath  - trend LFTs  - avoid hepatotoxic agents

## 2018-06-13 NOTE — PROGRESS NOTE ADULT - PROBLEM SELECTOR PLAN 2
Patient w/ documented h/o stomach upset vs anaphylaxis w/ asa  - Allergy and immunology evaluated patient, determined she doesn't need asa desensitization as she does not have true asa allergy

## 2018-06-13 NOTE — PROGRESS NOTE ADULT - PROBLEM SELECTOR PLAN 4
C/w captopril  Monitor BP  Low Na diet Patient hyponatremic this admission, etiology unclear  - Will check serum osm, urine osm, and urine Na  - Monitor BMP

## 2018-06-13 NOTE — CONSULT NOTE ADULT - SUBJECTIVE AND OBJECTIVE BOX
HPI:  Tabitha Tsai is a 72 year old woman with PMHx significant for HTN, lung resection for lung ca in distant past who presents with epigastric pain for multiple hours. This started off mild with chest pain, which then moved down to epigastric area. She denies any SOB at all during this and she has never had this previously. She recently came here from [place] 2 weeks ago and her/family is unclear of her follow up. However, family does state that patient's mother passed away from a heart attack. Taken to the cath lab 2 ALEK to D1, found to have 80% mid RCA.     Allergy consulted for report of ASA allergy in the chart. On discussion with patient, she states that she takes ASA 81mg frequently with no untoward effects. She gets abdominal pain with higher doses. No history of anaphylaxis-- no respiratory distress, wheezing, hives, erythema, or swelling.      Allergies  atenolol (Unknown)      MEDICATIONS  (STANDING):  aspirin enteric coated 81 milliGRAM(s) Oral daily  atorvastatin 80 milliGRAM(s) Oral at bedtime  captopril 6.25 milliGRAM(s) Oral three times a day  chlorhexidine 4% Liquid 1 Application(s) Topical <User Schedule>  insulin lispro (HumaLOG) corrective regimen sliding scale   SubCutaneous three times a day before meals  pantoprazole    Tablet 40 milliGRAM(s) Oral before breakfast  potassium chloride    Tablet ER 40 milliEquivalent(s) Oral every 4 hours  ticagrelor 90 milliGRAM(s) Oral two times a day      PAST MEDICAL & SURGICAL HISTORY:  Lung mass  HTN (hypertension)  History of lung surgery      REVIEW OF SYSTEMS  All review of systems negative, except for those marked:  General:		  Eyes:			  ENT:			  Pulmonary:		  Cardiac:	 as per HPI  		    Allergy/Immune:	 as per HPI      FAMILY HISTORY:  No pertinent family history in first degree relatives    Vital Signs Last 24 Hrs  T(C): 36.9 (13 Jun 2018 09:00), Max: 36.9 (13 Jun 2018 00:00)  T(F): 98.5 (13 Jun 2018 09:00), Max: 98.5 (13 Jun 2018 00:00)  HR: 78 (13 Jun 2018 08:00) (63 - 81)  BP: 152/77 (13 Jun 2018 08:00) (150/68 - 222/95)  BP(mean): 97 (13 Jun 2018 08:00) (89 - 105)  RR: 19 (13 Jun 2018 08:00) (13 - 21)  SpO2: 98% (13 Jun 2018 08:00) (97% - 100%)    PHYSICAL EXAM  All physical exam findings normal, except for those marked:  General:	     alert, well developed/well nourished, no acute distress  Eyes                      no conjunctival injection, no discharge, no photophobia, intact                               extraocular movements, sclera not icteric, no suborbital bogginess  ENT:                      normal tympanic membranes; external ear normal, normal nasal mucosa and turbinates without discharge, no                               pharyngeal erythema or exudates, no cobblestoning, normal tongue and lips, normal tonsils   Neck                      supple, full range of motion  Lymph Nodes         normal size and consistency, non-tender  Cardiovascular        regular rate and rhythm; Normal S1, S2; No murmur  Respiratory	      good air movement bilaterally, no wheezing or crackles,  no retractions  Abdominal	      soft; ND, NT, no hepatosplenomegaly  Skin		      skin intact and not indurated; no rash, no desquamation  Neurologic	      alert, appropriate for age, cranial nerves II-XII grossly normal    Lab Results:                        13.5   18.34 )-----------( 384      ( 13 Jun 2018 05:15 )             38.7     06-13    126<L>  |  85<L>  |  10  ----------------------------<  139<H>  3.1<L>   |  24  |  0.65    Ca    9.3      13 Jun 2018 05:15  Phos  3.2     06-13  Mg     1.7     06-13    TPro  8.1  /  Alb  3.6  /  TBili  1.2  /  DBili  x   /  AST  113<H>  /  ALT  27  /  AlkPhos  131<H>  06-13    LIVER FUNCTIONS - ( 13 Jun 2018 05:15 )  Alb: 3.6 g/dL / Pro: 8.1 g/dL / ALK PHOS: 131 u/L / ALT: 27 u/L / AST: 113 u/L / GGT: x           PT/INR - ( 12 Jun 2018 10:06 )   PT: 12.9 SEC;   INR: 1.16          PTT - ( 12 Jun 2018 10:06 )  PTT:34.5 SEC

## 2018-06-13 NOTE — DISCHARGE NOTE ADULT - PATIENT PORTAL LINK FT
You can access the [facility] Patient Portal, offered by [facility], by registering with the following website: [email or web address]

## 2018-06-13 NOTE — DISCHARGE NOTE ADULT - PROVIDER TOKENS
FREE:[LAST:[Amelia],FIRST:[Azalea],PHONE:[phone]],FAX:[(   [phone]    -],ADDRESS:[[address] [place] [phone]]]

## 2018-06-13 NOTE — PROGRESS NOTE ADULT - PROBLEM SELECTOR PLAN 5
Patient has h/o lung ca s/p resection Likely reactive in setting of cath; Patient afebrile and non-toxic in appearance  - Continue to monitor CBC

## 2018-06-13 NOTE — DISCHARGE NOTE ADULT - HOSPITAL COURSE
72F with HTN and lung CA, s/p resection presents with epigastric discomfort and possible NSTEMI in setting of HTN emergency. Cardiac enzymes and troponin elevated with EDUARDO in V1V2.  In ER BP was controlled with multiple doses of hydralazine and NTG. ACS protocol initiated with plavix load and heparin gtt, no ASA given reported allergy with anaphylaxis. Taken for urgent cath with ALEK X 2 to D1. Brilinta load was also given in cath lab. Patient admitted to CCU for ASA desensitization and staged procedure to 80% mRCA lesion. Allergy/immunology consult done which determined no ASA allergy only GI upset at high doses, 81 mg is tolerated well. Patient remains stable and is awaiting staged PCI to mRCA. Metoprolol and captopril started. Cardiac enzymes are currently trending down. Patient is a 72 year old female with a history of hypertension and lung cancer, status-post resection, who presented with epigastric discomfort. In the emergency department, patient was found to be hypertensive and required hydralazine and nitroglycerin. Patient was also found to have elevated cardiac enzymes ST segment elevations in leads V1 and V2. Acute coronary syndrome protocol was initiated with plavix load and heparin drip. Aspirin was not given in light of reported anaphylactic response to aspirin. Patient underwent cardiac catheterization and was found to have 80% mRCA lesion. Two drug-eluting stents were placed to D1. Patient was also loaded with brilinta in the catheterization lab. Patient was admitted to the coronary care unit for post-catheterization care. Captopril was started. Allergy and immunology evaluated patient and determined that patient only has gastrointestinal upset with high doses of aspirin but does not have a true aspirin allergy. Aspirin was subsequently started. Transthoracic echocardiogram was performed which showed ejection fraction 45-50%, mild segmental left ventricular systolic dysfunction, and basal to mid-septum hypokinesis. Lopressor was initiated. Cardiac enzymes downtrended. Patient underwent staged cardiac catheterization. Patient was hyponatremic. Patient is a 72 year old female with a history of hypertension and lung cancer, status-post resection, who presented with epigastric discomfort. In the emergency department, patient was found to be hypertensive and required hydralazine and nitroglycerin. Patient was also found to have elevated cardiac enzymes ST segment elevations in leads V1 and V2. Acute coronary syndrome protocol was initiated with plavix load and heparin drip. Aspirin was not given in light of reported anaphylactic response to aspirin. Patient underwent cardiac catheterization and was found to have 80% mRCA lesion. Two drug-eluting stents were placed to D1. Patient was also loaded with brilinta in the catheterization lab. Patient was admitted to the coronary care unit for post-catheterization care. Captopril was started. Allergy and immunology evaluated patient and determined that patient only has gastrointestinal upset with high doses of aspirin but does not have a true aspirin allergy. Aspirin was subsequently started. Transthoracic echocardiogram was performed which showed ejection fraction 45-50%, mild segmental left ventricular systolic dysfunction, and basal to mid-septum hypokinesis. Lopressor was initiated. Cardiac enzymes downtrended. Patient underwent staged cardiac catheterization. Patient was hyponatremic. Workup was consistent with volume overload. Patient is a 72 year old female with a history of hypertension and lung cancer, status-post resection, who presented with epigastric discomfort. In the emergency department, patient was found to be hypertensive and required hydralazine and nitroglycerin. Patient was also found to have elevated cardiac enzymes ST segment elevations in leads V1 and V2. Acute coronary syndrome protocol was initiated with plavix load and heparin drip. Aspirin was not given in light of reported anaphylactic response to aspirin. Patient underwent cardiac catheterization and was found to have 80% mRCA lesion. Two drug-eluting stents were placed to D1. Patient was also loaded with brilinta in the catheterization lab. Patient was admitted to the coronary care unit for post-catheterization care. Captopril was started. Allergy and immunology evaluated patient and determined that patient only has gastrointestinal upset with high doses of aspirin but does not have a true aspirin allergy. Aspirin was subsequently started. Transthoracic echocardiogram was performed which showed ejection fraction 45-50%, mild segmental left ventricular systolic dysfunction, and basal to mid-septum hypokinesis. Lopressor was initiated. Cardiac enzymes downtrended. Patient underwent staged cardiac catheterization. Patient was hyponatremic. Workup was consistent with volume overload. Underwent staged right coronary artery percutaneous coronary intervention with drug eluting stent and balloon angioplasty to 80% right coronary artery lesion. Captopril was discontinued and lisinopril was started. Lopressor was discontinued and toprol XL was started. Patient is clinically stable for discharge. Patient is a 72 year old female with a history of hypertension and lung cancer, status-post resection, who presented with epigastric discomfort. In the emergency department, patient was found to be hypertensive and required hydralazine and nitroglycerin. Patient was also found to have elevated cardiac enzymes ST segment elevations in leads V1 and V2. Acute coronary syndrome protocol was initiated with plavix load and heparin drip. Aspirin was not given in light of reported anaphylactic response to aspirin. Patient underwent cardiac catheterization and was found to have 80% mRCA lesion. Two drug-eluting stents were placed to D1. Patient was also loaded with brilinta in the catheterization lab. Patient was admitted to the coronary care unit for post-catheterization care. Captopril was started. Allergy and immunology evaluated patient and determined that patient only has gastrointestinal upset with high doses of aspirin but does not have a true aspirin allergy. Aspirin was subsequently started. Transthoracic echocardiogram was performed which showed ejection fraction 45-50%, mild segmental left ventricular systolic dysfunction, and basal to mid-septum hypokinesis. Lopressor was initiated. Cardiac enzymes downtrended. Patient underwent staged cardiac catheterization. Patient was hyponatremic. Workup was consistent with volume overload. Underwent staged right coronary artery percutaneous coronary intervention with drug eluting stent and balloon angioplasty to 80% right coronary artery lesion. Captopril was discontinued and lisinopril was started. Lopressor was discontinued and toprol XL was started. Patient complained of blurry vision with red and green floaters, worse on the left than the right, and subsequently developed a headache. CT scan of the head, which was ordered to rule out embolus in the setting of recent cardiac catheterization, showed no evidence of infarct or hemorrhage. Patient is clinically stable for discharge.

## 2018-06-13 NOTE — DISCHARGE NOTE ADULT - MEDICATION SUMMARY - MEDICATIONS TO TAKE
I will START or STAY ON the medications listed below when I get home from the hospital:    Percocet 5/325  -- 1 tab(s) by mouth every 6 hours, As Needed MDD:4 tabs for pain. Please do not drive while using this medication.  -- Indication: For Pain    aspirin 81 mg oral delayed release tablet  -- 1 tab(s) by mouth once a day  -- Indication: For NSTEMI (non-ST elevated myocardial infarction)    lisinopril 5 mg oral tablet  -- 1 tab(s) by mouth once a day  -- Indication: For HTN (hypertension)    atorvastatin 80 mg oral tablet  -- 1 tab(s) by mouth once a day (at bedtime)  -- Indication: For NSTEMI (non-ST elevated myocardial infarction)    ticagrelor 90 mg oral tablet  -- 1 tab(s) by mouth 2 times a day  -- Indication: For NSTEMI (non-ST elevated myocardial infarction)    metoprolol succinate 25 mg oral tablet, extended release  -- 1 tab(s) by mouth once a day  -- Indication: For HTN (hypertension)

## 2018-06-14 LAB
ALBUMIN SERPL ELPH-MCNC: 3.7 G/DL — SIGNIFICANT CHANGE UP (ref 3.3–5)
ALP SERPL-CCNC: 122 U/L — HIGH (ref 40–120)
ALT FLD-CCNC: 22 U/L — SIGNIFICANT CHANGE UP (ref 4–33)
AST SERPL-CCNC: 64 U/L — HIGH (ref 4–32)
BILIRUB SERPL-MCNC: 1.2 MG/DL — SIGNIFICANT CHANGE UP (ref 0.2–1.2)
BUN SERPL-MCNC: 18 MG/DL — SIGNIFICANT CHANGE UP (ref 7–23)
CALCIUM SERPL-MCNC: 9.5 MG/DL — SIGNIFICANT CHANGE UP (ref 8.4–10.5)
CHLORIDE SERPL-SCNC: 95 MMOL/L — LOW (ref 98–107)
CO2 SERPL-SCNC: 25 MMOL/L — SIGNIFICANT CHANGE UP (ref 22–31)
CREAT SERPL-MCNC: 0.94 MG/DL — SIGNIFICANT CHANGE UP (ref 0.5–1.3)
GLUCOSE SERPL-MCNC: 124 MG/DL — HIGH (ref 70–99)
HCT VFR BLD CALC: 42.2 % — SIGNIFICANT CHANGE UP (ref 34.5–45)
HGB BLD-MCNC: 13.9 G/DL — SIGNIFICANT CHANGE UP (ref 11.5–15.5)
LACTATE SERPL-SCNC: 1.3 MMOL/L — SIGNIFICANT CHANGE UP (ref 0.5–2)
MAGNESIUM SERPL-MCNC: 2.4 MG/DL — SIGNIFICANT CHANGE UP (ref 1.6–2.6)
MCHC RBC-ENTMCNC: 26.4 PG — LOW (ref 27–34)
MCHC RBC-ENTMCNC: 32.9 % — SIGNIFICANT CHANGE UP (ref 32–36)
MCV RBC AUTO: 80.2 FL — SIGNIFICANT CHANGE UP (ref 80–100)
NRBC # FLD: 0 — SIGNIFICANT CHANGE UP
PHOSPHATE SERPL-MCNC: 2 MG/DL — LOW (ref 2.5–4.5)
PLATELET # BLD AUTO: 410 K/UL — HIGH (ref 150–400)
PMV BLD: 10.7 FL — SIGNIFICANT CHANGE UP (ref 7–13)
POTASSIUM SERPL-MCNC: 4 MMOL/L — SIGNIFICANT CHANGE UP (ref 3.5–5.3)
POTASSIUM SERPL-SCNC: 4 MMOL/L — SIGNIFICANT CHANGE UP (ref 3.5–5.3)
PROT SERPL-MCNC: 8 G/DL — SIGNIFICANT CHANGE UP (ref 6–8.3)
RBC # BLD: 5.26 M/UL — HIGH (ref 3.8–5.2)
RBC # FLD: 14.9 % — HIGH (ref 10.3–14.5)
SODIUM SERPL-SCNC: 134 MMOL/L — LOW (ref 135–145)
WBC # BLD: 14.28 K/UL — HIGH (ref 3.8–10.5)
WBC # FLD AUTO: 14.28 K/UL — HIGH (ref 3.8–10.5)

## 2018-06-14 PROCEDURE — 99233 SBSQ HOSP IP/OBS HIGH 50: CPT

## 2018-06-14 PROCEDURE — 92928 PRQ TCAT PLMT NTRAC ST 1 LES: CPT | Mod: RC

## 2018-06-14 PROCEDURE — 99152 MOD SED SAME PHYS/QHP 5/>YRS: CPT

## 2018-06-14 RX ORDER — METOPROLOL TARTRATE 50 MG
25 TABLET ORAL DAILY
Qty: 0 | Refills: 0 | Status: DISCONTINUED | OUTPATIENT
Start: 2018-06-15 | End: 2018-06-15

## 2018-06-14 RX ORDER — LISINOPRIL 2.5 MG/1
5 TABLET ORAL DAILY
Qty: 0 | Refills: 0 | Status: DISCONTINUED | OUTPATIENT
Start: 2018-06-14 | End: 2018-06-15

## 2018-06-14 RX ORDER — METOPROLOL TARTRATE 50 MG
12.5 TABLET ORAL ONCE
Qty: 0 | Refills: 0 | Status: COMPLETED | OUTPATIENT
Start: 2018-06-14 | End: 2018-06-14

## 2018-06-14 RX ADMIN — TICAGRELOR 90 MILLIGRAM(S): 90 TABLET ORAL at 18:29

## 2018-06-14 RX ADMIN — Medication 12.5 MILLIGRAM(S): at 18:29

## 2018-06-14 RX ADMIN — CHLORHEXIDINE GLUCONATE 1 APPLICATION(S): 213 SOLUTION TOPICAL at 12:11

## 2018-06-14 RX ADMIN — TICAGRELOR 90 MILLIGRAM(S): 90 TABLET ORAL at 05:59

## 2018-06-14 RX ADMIN — CAPTOPRIL 6.25 MILLIGRAM(S): 12.5 TABLET ORAL at 05:59

## 2018-06-14 RX ADMIN — Medication 12.5 MILLIGRAM(S): at 05:59

## 2018-06-14 RX ADMIN — ATORVASTATIN CALCIUM 80 MILLIGRAM(S): 80 TABLET, FILM COATED ORAL at 21:59

## 2018-06-14 RX ADMIN — Medication 63.75 MILLIMOLE(S): at 12:08

## 2018-06-14 RX ADMIN — Medication 81 MILLIGRAM(S): at 08:04

## 2018-06-14 RX ADMIN — PANTOPRAZOLE SODIUM 40 MILLIGRAM(S): 20 TABLET, DELAYED RELEASE ORAL at 12:09

## 2018-06-14 RX ADMIN — LISINOPRIL 5 MILLIGRAM(S): 2.5 TABLET ORAL at 20:08

## 2018-06-14 NOTE — PROGRESS NOTE ADULT - ASSESSMENT
72 year old woman with PMHx significant for HTN, lung resection for lung ca who presents with epigastric pain. Presentation is concerning for UA given EKG changes, trop elevation, elevated JEFF but potentially in setting hypertensive emergency which can also cause her repol changes. S/p Cath with 2 ALEK to D1, found to also have 80% occlusion mid RCA.  Admitted to CCU for monitoring post cath, pending staged RCA PCI. 72 year old woman with PMHx significant for HTN, lung resection for lung ca who presents with epigastric pain. Presentation is concerning for UA given EKG changes, trop elevation, elevated JEFF but potentially in setting hypertensive emergency which can also cause her repol changes. S/p Cath with 2 ALEK to D1, found to also have 80% occlusion mid RCA.  Admitted to CCU for monitoring post cath, s/p RCA PCI w/ ALEK x1 and balloon angioplasty.

## 2018-06-14 NOTE — PROGRESS NOTE ADULT - SUBJECTIVE AND OBJECTIVE BOX
Patient is a 72y old  Female who presents with a chief complaint of epigastric pain (13 Jun 2018 12:40)      SUBJECTIVE / OVERNIGHT EVENTS:  Patient seen and examined at bedside. No acute events overnight. Feels well this AM. Denies fever, lightheadedness, diaphoresis, SOB, CP, palpitations, nausea, and abdominal pain. For staged RCA PCI today.    ROS:  See SUBJECTIVE / OVERNIGHT EVENTS    MEDICATIONS  (STANDING):  aspirin enteric coated 81 milliGRAM(s) Oral daily  atorvastatin 80 milliGRAM(s) Oral at bedtime  captopril 6.25 milliGRAM(s) Oral three times a day  chlorhexidine 4% Liquid 1 Application(s) Topical <User Schedule>  metoprolol tartrate 12.5 milliGRAM(s) Oral two times a day  pantoprazole    Tablet 40 milliGRAM(s) Oral before breakfast  sodium phosphate IVPB 15 milliMole(s) IV Intermittent once  ticagrelor 90 milliGRAM(s) Oral two times a day    MEDICATIONS  (PRN):      Vital Signs Last 24 Hrs  T(C): 36.8 (14 Jun 2018 04:00), Max: 36.9 (13 Jun 2018 09:00)  T(F): 98.2 (14 Jun 2018 04:00), Max: 98.5 (13 Jun 2018 09:00)  HR: 71 (14 Jun 2018 07:00) (71 - 94)  BP: 121/60 (14 Jun 2018 07:00) (102/65 - 170/86)  BP(mean): 75 (14 Jun 2018 07:00) (67 - 108)  RR: 13 (14 Jun 2018 07:00) (10 - 23)  SpO2: 99% (14 Jun 2018 07:00) (93% - 99%)  CAPILLARY BLOOD GLUCOSE      POCT Blood Glucose.: 139 mg/dL (13 Jun 2018 09:11)    I&O's Summary    13 Jun 2018 07:01  -  14 Jun 2018 07:00  --------------------------------------------------------  IN: 770 mL / OUT: 1550 mL / NET: -780 mL        PHYSICAL EXAM:  GENERAL: NAD, well-developed  HEAD: Atraumatic, Normocephalic  EYES: EOMI, conjunctiva and sclera clear  NECK: Supple, no LAD  CHEST/LUNG: Breath sounds CTA b/l; No wheeze  HEART: RRR, S1 and S2 present; No murmurs  ABDOMEN: Soft, NT, ND; + Bowel sounds  EXTREMITIES: No clubbing, cyanosis, or edema  VASCULAR: Peripheral pulses 2+ b/l  NEUROLOGY: No focal deficits  SKIN: No rashes or lesions    LABS:                        13.9   14.28 )-----------( 410      ( 14 Jun 2018 05:20 )             42.2     WBC Trend: 14.28<--, 18.34<--, 16.59<--  06-14    134<L>  |  95<L>  |  18  ----------------------------<  124<H>  4.0   |  25  |  0.94    Ca    9.5      14 Jun 2018 05:20  Phos  2.0     06-14  Mg     2.4     06-14    TPro  8.0  /  Alb  3.7  /  TBili  1.2  /  DBili  x   /  AST  64<H>  /  ALT  22  /  AlkPhos  122<H>  06-14    Creatinine Trend: 0.94<--, 0.65<--, 0.61<--  PT/INR - ( 12 Jun 2018 10:06 )   PT: 12.9 SEC;   INR: 1.16          PTT - ( 12 Jun 2018 10:06 )  PTT:34.5 SEC  CARDIAC MARKERS ( 13 Jun 2018 11:05 )  x     / x     / 825 u/L / 59.49 ng/mL / x      CARDIAC MARKERS ( 13 Jun 2018 05:15 )  x     / 1.86 ng/mL / 951 u/L / 80.96 ng/mL / x      CARDIAC MARKERS ( 12 Jun 2018 20:00 )  x     / 1.38 ng/mL / 926 u/L / 97.35 ng/mL / x      CARDIAC MARKERS ( 12 Jun 2018 09:50 )  x     / 1.13 ng/mL / x     / x     / x                RADIOLOGY & ADDITIONAL TESTS:    Imaging Personally Reviewed:    TTE: < from: Transthoracic Echocardiogram (06.13.18 @ 10:22) >  CONCLUSIONS:  1. Mitral annular calcification, otherwise normal mitral  valve.  2. Calcified trileaflet aortic valvewith normal opening.  3. Normal left ventricular internal dimensions and wall  thicknesses.  4. Mild segmental left ventricular systolic dysfunction.  The basal to mid septum is hypokinetic.  5. Normal right ventricular size and systolic function.    Consultant(s) Notes Reviewed: Allergy and Immunology    Care Discussed with Consultants/Other Providers:    CONTACT #: [id number] Patient is a 72y old  Female who presents with a chief complaint of epigastric pain (13 Jun 2018 12:40)      SUBJECTIVE / OVERNIGHT EVENTS:  Patient seen and examined at bedside. No acute events overnight. Feels well this AM. Denies fever, lightheadedness, diaphoresis, SOB, CP, palpitations, nausea, and abdominal pain. For staged RCA PCI today.    ROS:  See SUBJECTIVE / OVERNIGHT EVENTS    MEDICATIONS  (STANDING):  aspirin enteric coated 81 milliGRAM(s) Oral daily  atorvastatin 80 milliGRAM(s) Oral at bedtime  captopril 6.25 milliGRAM(s) Oral three times a day  chlorhexidine 4% Liquid 1 Application(s) Topical <User Schedule>  metoprolol tartrate 12.5 milliGRAM(s) Oral two times a day  pantoprazole    Tablet 40 milliGRAM(s) Oral before breakfast  sodium phosphate IVPB 15 milliMole(s) IV Intermittent once  ticagrelor 90 milliGRAM(s) Oral two times a day    MEDICATIONS  (PRN):      Vital Signs Last 24 Hrs  T(C): 36.8 (14 Jun 2018 04:00), Max: 36.9 (13 Jun 2018 09:00)  T(F): 98.2 (14 Jun 2018 04:00), Max: 98.5 (13 Jun 2018 09:00)  HR: 71 (14 Jun 2018 07:00) (71 - 94)  BP: 121/60 (14 Jun 2018 07:00) (102/65 - 170/86)  BP(mean): 75 (14 Jun 2018 07:00) (67 - 108)  RR: 13 (14 Jun 2018 07:00) (10 - 23)  SpO2: 99% (14 Jun 2018 07:00) (93% - 99%)  CAPILLARY BLOOD GLUCOSE      POCT Blood Glucose.: 139 mg/dL (13 Jun 2018 09:11)    I&O's Summary    13 Jun 2018 07:01  -  14 Jun 2018 07:00  --------------------------------------------------------  IN: 770 mL / OUT: 1550 mL / NET: -780 mL        PHYSICAL EXAM:  GENERAL: NAD, well-developed  HEAD: Atraumatic, Normocephalic  EYES: EOMI, conjunctiva and sclera clear  NECK: Supple, no LAD  CHEST/LUNG: Breath sounds CTA b/l; No wheeze  HEART: RRR, S1 and S2 present; No murmurs  ABDOMEN: Soft, NT, ND; + Bowel sounds  EXTREMITIES: No clubbing, cyanosis, or edema  VASCULAR: Peripheral pulses 2+ b/l  NEUROLOGY: No focal deficits  SKIN: No rashes or lesions    LABS:                        13.9   14.28 )-----------( 410      ( 14 Jun 2018 05:20 )             42.2     WBC Trend: 14.28<--, 18.34<--, 16.59<--  06-14    134<L>  |  95<L>  |  18  ----------------------------<  124<H>  4.0   |  25  |  0.94    Ca    9.5      14 Jun 2018 05:20  Phos  2.0     06-14  Mg     2.4     06-14    TPro  8.0  /  Alb  3.7  /  TBili  1.2  /  DBili  x   /  AST  64<H>  /  ALT  22  /  AlkPhos  122<H>  06-14    Creatinine Trend: 0.94<--, 0.65<--, 0.61<--  PT/INR - ( 12 Jun 2018 10:06 )   PT: 12.9 SEC;   INR: 1.16          PTT - ( 12 Jun 2018 10:06 )  PTT:34.5 SEC  CARDIAC MARKERS ( 13 Jun 2018 11:05 )  x     / x     / 825 u/L / 59.49 ng/mL / x      CARDIAC MARKERS ( 13 Jun 2018 05:15 )  x     / 1.86 ng/mL / 951 u/L / 80.96 ng/mL / x      CARDIAC MARKERS ( 12 Jun 2018 20:00 )  x     / 1.38 ng/mL / 926 u/L / 97.35 ng/mL / x      CARDIAC MARKERS ( 12 Jun 2018 09:50 )  x     / 1.13 ng/mL / x     / x     / x        Lactate, Blood (06.14.18 @ 05:20)    Lactate, Blood: 1.3 mmol/L            RADIOLOGY & ADDITIONAL TESTS:    Imaging Personally Reviewed:    TTE: < from: Transthoracic Echocardiogram (06.13.18 @ 10:22) >  CONCLUSIONS:  1. Mitral annular calcification, otherwise normal mitral  valve.  2. Calcified trileaflet aortic valvewith normal opening.  3. Normal left ventricular internal dimensions and wall  thicknesses.  4. Mild segmental left ventricular systolic dysfunction.  The basal to mid septum is hypokinetic.  5. Normal right ventricular size and systolic function.    Consultant(s) Notes Reviewed: Allergy and Immunology    Care Discussed with Consultants/Other Providers:    CONTACT #: [id number] Patient is a 72y old  Female who presents with a chief complaint of epigastric pain (13 Jun 2018 12:40)      SUBJECTIVE / OVERNIGHT EVENTS:  Patient seen and examined at bedside. No acute events overnight. Feels well this AM. Denies fever, lightheadedness, diaphoresis, SOB, CP, palpitations, nausea, and abdominal pain. Underwent staged RCA PCI today w/ ALEK x1 and balloon angioplasty, tolerated procedure well.    ROS:  See SUBJECTIVE / OVERNIGHT EVENTS    MEDICATIONS  (STANDING):  aspirin enteric coated 81 milliGRAM(s) Oral daily  atorvastatin 80 milliGRAM(s) Oral at bedtime  captopril 6.25 milliGRAM(s) Oral three times a day  chlorhexidine 4% Liquid 1 Application(s) Topical <User Schedule>  metoprolol tartrate 12.5 milliGRAM(s) Oral two times a day  pantoprazole    Tablet 40 milliGRAM(s) Oral before breakfast  sodium phosphate IVPB 15 milliMole(s) IV Intermittent once  ticagrelor 90 milliGRAM(s) Oral two times a day    MEDICATIONS  (PRN):      Vital Signs Last 24 Hrs  T(C): 36.8 (14 Jun 2018 04:00), Max: 36.9 (13 Jun 2018 09:00)  T(F): 98.2 (14 Jun 2018 04:00), Max: 98.5 (13 Jun 2018 09:00)  HR: 71 (14 Jun 2018 07:00) (71 - 94)  BP: 121/60 (14 Jun 2018 07:00) (102/65 - 170/86)  BP(mean): 75 (14 Jun 2018 07:00) (67 - 108)  RR: 13 (14 Jun 2018 07:00) (10 - 23)  SpO2: 99% (14 Jun 2018 07:00) (93% - 99%)  CAPILLARY BLOOD GLUCOSE      POCT Blood Glucose.: 139 mg/dL (13 Jun 2018 09:11)    I&O's Summary    13 Jun 2018 07:01  -  14 Jun 2018 07:00  --------------------------------------------------------  IN: 770 mL / OUT: 1550 mL / NET: -780 mL        PHYSICAL EXAM:  GENERAL: NAD, well-developed  HEAD: Atraumatic, Normocephalic  EYES: EOMI, conjunctiva and sclera clear  NECK: Supple, no LAD  CHEST/LUNG: Breath sounds CTA b/l; No wheeze  HEART: RRR, S1 and S2 present; No murmurs  ABDOMEN: Soft, NT, ND; + Bowel sounds  EXTREMITIES: No clubbing, cyanosis, or edema  VASCULAR: Peripheral pulses 2+ b/l  NEUROLOGY: No focal deficits  SKIN: No rashes or lesions    LABS:                        13.9   14.28 )-----------( 410      ( 14 Jun 2018 05:20 )             42.2     WBC Trend: 14.28<--, 18.34<--, 16.59<--  06-14    134<L>  |  95<L>  |  18  ----------------------------<  124<H>  4.0   |  25  |  0.94    Ca    9.5      14 Jun 2018 05:20  Phos  2.0     06-14  Mg     2.4     06-14    TPro  8.0  /  Alb  3.7  /  TBili  1.2  /  DBili  x   /  AST  64<H>  /  ALT  22  /  AlkPhos  122<H>  06-14    Creatinine Trend: 0.94<--, 0.65<--, 0.61<--  PT/INR - ( 12 Jun 2018 10:06 )   PT: 12.9 SEC;   INR: 1.16          PTT - ( 12 Jun 2018 10:06 )  PTT:34.5 SEC  CARDIAC MARKERS ( 13 Jun 2018 11:05 )  x     / x     / 825 u/L / 59.49 ng/mL / x      CARDIAC MARKERS ( 13 Jun 2018 05:15 )  x     / 1.86 ng/mL / 951 u/L / 80.96 ng/mL / x      CARDIAC MARKERS ( 12 Jun 2018 20:00 )  x     / 1.38 ng/mL / 926 u/L / 97.35 ng/mL / x      CARDIAC MARKERS ( 12 Jun 2018 09:50 )  x     / 1.13 ng/mL / x     / x     / x        Lactate, Blood (06.14.18 @ 05:20)    Lactate, Blood: 1.3 mmol/L            RADIOLOGY & ADDITIONAL TESTS:    Imaging Personally Reviewed:    TTE: < from: Transthoracic Echocardiogram (06.13.18 @ 10:22) >  CONCLUSIONS:  1. Mitral annular calcification, otherwise normal mitral  valve.  2. Calcified trileaflet aortic valvewith normal opening.  3. Normal left ventricular internal dimensions and wall  thicknesses.  4. Mild segmental left ventricular systolic dysfunction.  The basal to mid septum is hypokinetic.  5. Normal right ventricular size and systolic function.    Consultant(s) Notes Reviewed: Allergy and Immunology    Care Discussed with Consultants/Other Providers:    CONTACT #: [id number]

## 2018-06-14 NOTE — PROGRESS NOTE ADULT - PROBLEM SELECTOR PLAN 1
Underwent LHC, found to have 80% RCA occlusion s/p ALEK x2 to D1, for staged RCA PCI today  Loaded w/ plavix in ED and brilinta in cath lab  C/w brilinta, asa, atorvastatin, lopressor, and captopril  TTE significant for EF 45-50%, mild segmental LV systolic dysfunction, and basal to mid septum hypokinesis  Cardiac monitoring  Serial EKG for recurrent CP  Nik downtrending Underwent LHC, found to have 80% RCA occlusion s/p ALEK x2 to D1 on 6/12, s/p staged RCA PCI w/ ALEK x1 and balloon angioplasty  C/w brilinta, asa, and atorvastatin  D/makeda captopril, will start lisinopril this afternoon  Will c/w lopressor today and switch to toprol xl tomorrow morning  TTE significant for EF 45-50%, mild segmental LV systolic dysfunction, and basal to mid septum hypokinesis  Cardiac monitoring  Serial EKG for recurrent CP  Nik downtrending  Monitor right wrist for bleeding

## 2018-06-14 NOTE — PROGRESS NOTE ADULT - PROBLEM SELECTOR PLAN 7
C/w lopressor and captopril  Monitor BP, titrate antihypertensive regimen accordingly  Low Na diet D/makeda captopril, will start lisinopril this afternoon  Will c/w lopressor today and switch to toprol xl tomorrow morning  Monitor BP, titrate antihypertensive regimen accordingly  Low Na diet

## 2018-06-14 NOTE — PROGRESS NOTE ADULT - PROBLEM SELECTOR PLAN 6
Likely in setting of recent cath  - monitor LFTs, presently downtrending  - avoid hepatotoxic agents

## 2018-06-14 NOTE — PROGRESS NOTE ADULT - PROBLEM SELECTOR PLAN 4
Patient hyponatremic during this admission  - Workup consistent w/ volume overload  - Monitor BMP, Na presently trending up Patient hyponatremic during this admission  - Workup consistent w/ volume overload  - Monitor BMP, Na presently stable

## 2018-06-14 NOTE — PROGRESS NOTE ADULT - PROBLEM SELECTOR PLAN 5
Likely reactive in setting of cath; Patient afebrile and non-toxic in appearance  - Continue to monitor CBC, WBCs presently downtrending

## 2018-06-14 NOTE — PROGRESS NOTE ADULT - PROBLEM SELECTOR PLAN 9
DVT PPX: Holding pharmacological PPX as patient is pending staged RCA PCI; SCDs DVT PPX: Holding pharmacological PPX in setting of recent cath; SCDs

## 2018-06-14 NOTE — PROGRESS NOTE ADULT - SUBJECTIVE AND OBJECTIVE BOX
Right radial band removed.  No hematoma or active bleeding.  + pulse, VVS  DSD applied   Continue to monitor

## 2018-06-15 VITALS
DIASTOLIC BLOOD PRESSURE: 50 MMHG | OXYGEN SATURATION: 99 % | RESPIRATION RATE: 20 BRPM | HEART RATE: 77 BPM | SYSTOLIC BLOOD PRESSURE: 100 MMHG

## 2018-06-15 DIAGNOSIS — H53.8 OTHER VISUAL DISTURBANCES: ICD-10-CM

## 2018-06-15 LAB
ALBUMIN SERPL ELPH-MCNC: 3.3 G/DL — SIGNIFICANT CHANGE UP (ref 3.3–5)
ALP SERPL-CCNC: 114 U/L — SIGNIFICANT CHANGE UP (ref 40–120)
ALT FLD-CCNC: 20 U/L — SIGNIFICANT CHANGE UP (ref 4–33)
AST SERPL-CCNC: 41 U/L — HIGH (ref 4–32)
BILIRUB SERPL-MCNC: 1.2 MG/DL — SIGNIFICANT CHANGE UP (ref 0.2–1.2)
BUN SERPL-MCNC: 19 MG/DL — SIGNIFICANT CHANGE UP (ref 7–23)
CALCIUM SERPL-MCNC: 8.8 MG/DL — SIGNIFICANT CHANGE UP (ref 8.4–10.5)
CHLORIDE SERPL-SCNC: 94 MMOL/L — LOW (ref 98–107)
CO2 SERPL-SCNC: 24 MMOL/L — SIGNIFICANT CHANGE UP (ref 22–31)
CREAT SERPL-MCNC: 0.95 MG/DL — SIGNIFICANT CHANGE UP (ref 0.5–1.3)
GLUCOSE SERPL-MCNC: 112 MG/DL — HIGH (ref 70–99)
HCT VFR BLD CALC: 38.2 % — SIGNIFICANT CHANGE UP (ref 34.5–45)
HGB BLD-MCNC: 12.6 G/DL — SIGNIFICANT CHANGE UP (ref 11.5–15.5)
MAGNESIUM SERPL-MCNC: 2.2 MG/DL — SIGNIFICANT CHANGE UP (ref 1.6–2.6)
MCHC RBC-ENTMCNC: 26.8 PG — LOW (ref 27–34)
MCHC RBC-ENTMCNC: 33 % — SIGNIFICANT CHANGE UP (ref 32–36)
MCV RBC AUTO: 81.1 FL — SIGNIFICANT CHANGE UP (ref 80–100)
NRBC # FLD: 0 — SIGNIFICANT CHANGE UP
PHOSPHATE SERPL-MCNC: 3.1 MG/DL — SIGNIFICANT CHANGE UP (ref 2.5–4.5)
PLATELET # BLD AUTO: 324 K/UL — SIGNIFICANT CHANGE UP (ref 150–400)
PMV BLD: 9.9 FL — SIGNIFICANT CHANGE UP (ref 7–13)
POTASSIUM SERPL-MCNC: 3.5 MMOL/L — SIGNIFICANT CHANGE UP (ref 3.5–5.3)
POTASSIUM SERPL-SCNC: 3.5 MMOL/L — SIGNIFICANT CHANGE UP (ref 3.5–5.3)
PROT SERPL-MCNC: 7.2 G/DL — SIGNIFICANT CHANGE UP (ref 6–8.3)
RBC # BLD: 4.71 M/UL — SIGNIFICANT CHANGE UP (ref 3.8–5.2)
RBC # FLD: 15 % — HIGH (ref 10.3–14.5)
SODIUM SERPL-SCNC: 133 MMOL/L — LOW (ref 135–145)
WBC # BLD: 11.94 K/UL — HIGH (ref 3.8–10.5)
WBC # FLD AUTO: 11.94 K/UL — HIGH (ref 3.8–10.5)

## 2018-06-15 PROCEDURE — 99239 HOSP IP/OBS DSCHRG MGMT >30: CPT

## 2018-06-15 PROCEDURE — 70450 CT HEAD/BRAIN W/O DYE: CPT | Mod: 26

## 2018-06-15 RX ORDER — POTASSIUM CHLORIDE 20 MEQ
40 PACKET (EA) ORAL ONCE
Qty: 0 | Refills: 0 | Status: COMPLETED | OUTPATIENT
Start: 2018-06-15 | End: 2018-06-15

## 2018-06-15 RX ORDER — METOPROLOL TARTRATE 50 MG
1 TABLET ORAL
Qty: 30 | Refills: 0 | OUTPATIENT
Start: 2018-06-15 | End: 2018-07-14

## 2018-06-15 RX ORDER — ASPIRIN/CALCIUM CARB/MAGNESIUM 324 MG
1 TABLET ORAL
Qty: 30 | Refills: 0 | OUTPATIENT
Start: 2018-06-15 | End: 2018-07-14

## 2018-06-15 RX ORDER — TICAGRELOR 90 MG/1
1 TABLET ORAL
Qty: 60 | Refills: 0 | OUTPATIENT
Start: 2018-06-15 | End: 2018-07-14

## 2018-06-15 RX ORDER — ATORVASTATIN CALCIUM 80 MG/1
1 TABLET, FILM COATED ORAL
Qty: 30 | Refills: 0 | OUTPATIENT
Start: 2018-06-15 | End: 2018-07-14

## 2018-06-15 RX ORDER — ACETAMINOPHEN 500 MG
650 TABLET ORAL ONCE
Qty: 0 | Refills: 0 | Status: COMPLETED | OUTPATIENT
Start: 2018-06-15 | End: 2018-06-15

## 2018-06-15 RX ORDER — LISINOPRIL 2.5 MG/1
1 TABLET ORAL
Qty: 30 | Refills: 0 | OUTPATIENT
Start: 2018-06-15 | End: 2018-07-14

## 2018-06-15 RX ADMIN — CHLORHEXIDINE GLUCONATE 1 APPLICATION(S): 213 SOLUTION TOPICAL at 11:58

## 2018-06-15 RX ADMIN — TICAGRELOR 90 MILLIGRAM(S): 90 TABLET ORAL at 06:20

## 2018-06-15 RX ADMIN — Medication 40 MILLIEQUIVALENT(S): at 07:00

## 2018-06-15 RX ADMIN — Medication 650 MILLIGRAM(S): at 12:30

## 2018-06-15 RX ADMIN — LISINOPRIL 5 MILLIGRAM(S): 2.5 TABLET ORAL at 06:19

## 2018-06-15 RX ADMIN — PANTOPRAZOLE SODIUM 40 MILLIGRAM(S): 20 TABLET, DELAYED RELEASE ORAL at 06:20

## 2018-06-15 RX ADMIN — Medication 81 MILLIGRAM(S): at 11:57

## 2018-06-15 RX ADMIN — Medication 650 MILLIGRAM(S): at 11:58

## 2018-06-15 RX ADMIN — Medication 25 MILLIGRAM(S): at 06:19

## 2018-06-15 NOTE — PROGRESS NOTE ADULT - ASSESSMENT
72 year old woman with PMHx significant for HTN, lung resection for lung ca who presents with epigastric pain. Presentation is concerning for UA given EKG changes, trop elevation, elevated JEFF but potentially in setting hypertensive emergency which can also cause her repol changes. S/p Cath with 2 ALEK to D1, found to also have 80% occlusion mid RCA.  Admitted to CCU for monitoring post cath, s/p RCA PCI w/ ALEK x1 and balloon angioplasty.

## 2018-06-15 NOTE — PROGRESS NOTE ADULT - PROBLEM SELECTOR PLAN 1
Underwent LHC, found to have 80% RCA occlusion s/p ALEK x2 to D1 on 6/12, s/p staged RCA PCI w/ ALEK x1 and balloon angioplasty on 6/14  C/w brilinta, asa, atorvastatin, and lisinopril  D/makeda lopressor, switched to toprol XL  TTE significant for EF 45-50%, mild segmental LV systolic dysfunction, and basal to mid septum hypokinesis  Cardiac monitoring  Serial EKG and Nik for recurrent CP  Monitor right wrist for bleeding, no bleeding at this time Patient c/o blurry vision w/ red and green floaters (L > R) in setting of recent cath  - CTH ordered to r/o embolus  - Patient follows w/ ophthalmologist outpatient who would like patient to undergo cataract surgery Patient c/o blurry vision w/ red and green floaters (L > R), likely ocular migraine  - CTH ordered to r/o embolus in setting of recent cath Patient c/o blurry vision w/ red and green floaters (L > R), likely ocular migraine  - CTH ordered to r/o embolus in setting of recent cath  - Will give Tylenol and caffeinated coffee after return from CT

## 2018-06-15 NOTE — PROGRESS NOTE ADULT - PROBLEM SELECTOR PLAN 7
Patient has h/o lung ca s/p resection C/w lisinopril  D/makeda lopressor, switched to toprol XL  Monitor BP, titrate antihypertensive regimen accordingly  Low Na diet

## 2018-06-15 NOTE — PROGRESS NOTE ADULT - SUBJECTIVE AND OBJECTIVE BOX
Patient is a 72y old  Female who presents with a chief complaint of epigastric pain (13 Jun 2018 12:40)      SUBJECTIVE / OVERNIGHT EVENTS:  Patient seen and examined at bedside. No acute events overnight. Feels well this AM. Denies fever, light-headedness, SOB, CP, palpitations, nausea, and abdominal pain.    ROS:  See SUBJECTIVE / OVERNIGHT EVENTS    MEDICATIONS  (STANDING):  aspirin enteric coated 81 milliGRAM(s) Oral daily  atorvastatin 80 milliGRAM(s) Oral at bedtime  chlorhexidine 4% Liquid 1 Application(s) Topical <User Schedule>  lisinopril 5 milliGRAM(s) Oral daily  metoprolol succinate ER 25 milliGRAM(s) Oral daily  pantoprazole    Tablet 40 milliGRAM(s) Oral before breakfast  ticagrelor 90 milliGRAM(s) Oral two times a day    MEDICATIONS  (PRN):      Vital Signs Last 24 Hrs  T(C): 36.6 (15 Anuj 2018 06:00), Max: 36.7 (14 Jun 2018 08:01)  T(F): 97.9 (15 Anuj 2018 06:00), Max: 98.1 (14 Jun 2018 08:01)  HR: 77 (15 Anuj 2018 06:00) (70 - 101)  BP: 105/57 (15 Anuj 2018 06:00) (96/60 - 146/64)  BP(mean): 69 (15 Anuj 2018 06:00) (68 - 88)  RR: 17 (15 Anuj 2018 06:00) (13 - 25)  SpO2: 94% (14 Jun 2018 15:00) (94% - 99%)  CAPILLARY BLOOD GLUCOSE        I&O's Summary    13 Jun 2018 07:01  -  14 Jun 2018 07:00  --------------------------------------------------------  IN: 770 mL / OUT: 1550 mL / NET: -780 mL    14 Jun 2018 07:01  -  15 Anuj 2018 06:48  --------------------------------------------------------  IN: 350 mL / OUT: 500 mL / NET: -150 mL        PHYSICAL EXAM:  GENERAL: NAD, well-developed  HEAD: Atraumatic, Normocephalic  EYES: EOMI, conjunctiva and sclera clear  NECK: Supple, no LAD  CHEST/LUNG: Breath sounds CTA b/l; No wheeze  HEART: RRR, S1 and S2 present; No murmurs  ABDOMEN: Soft, NT, ND; + Bowel sounds  EXTREMITIES: No clubbing, cyanosis, or edema  VASCULAR: Peripheral pulses 2+ b/l  NEUROLOGY: No focal deficits  SKIN: No rashes or lesions    LABS:                        12.6   11.94 )-----------( 324      ( 15 Aunj 2018 06:00 )             38.2     WBC Trend: 11.94<--, 14.28<--, 18.34<--  06-15    133<L>  |  94<L>  |  19  ----------------------------<  112<H>  3.5   |  24  |  0.95    Ca    8.8      15 Anuj 2018 06:00  Phos  3.1     06-15  Mg     2.2     06-15    TPro  7.2  /  Alb  3.3  /  TBili  1.2  /  DBili  x   /  AST  41<H>  /  ALT  20  /  AlkPhos  114  06-15    Creatinine Trend: 0.95<--, 0.94<--, 0.65<--, 0.61<--    CARDIAC MARKERS ( 13 Jun 2018 11:05 )  x     / x     / 825 u/L / 59.49 ng/mL / x                RADIOLOGY & ADDITIONAL TESTS:    Imaging Personally Reviewed:    Consultant(s) Notes Reviewed:      Care Discussed with Consultants/Other Providers:    CONTACT #: [id number] Patient is a 72y old  Female who presents with a chief complaint of epigastric pain (13 Jun 2018 12:40)      SUBJECTIVE / OVERNIGHT EVENTS:  Patient seen and examined at bedside. No acute events overnight. C/o blurry vision w/ red and green floaters (L > R) this AM. Patient has experiences similar symptoms in the past although not as severe as today. Patient follows w/ ophthalmologist outpatient who was recommending cataract surgery. Patient has not yet undergone surgery. Denies fever, HA, light-headedness, SOB, CP, palpitations, nausea, and abdominal pain.    ROS:  See SUBJECTIVE / OVERNIGHT EVENTS    MEDICATIONS  (STANDING):  aspirin enteric coated 81 milliGRAM(s) Oral daily  atorvastatin 80 milliGRAM(s) Oral at bedtime  chlorhexidine 4% Liquid 1 Application(s) Topical <User Schedule>  lisinopril 5 milliGRAM(s) Oral daily  metoprolol succinate ER 25 milliGRAM(s) Oral daily  pantoprazole    Tablet 40 milliGRAM(s) Oral before breakfast  ticagrelor 90 milliGRAM(s) Oral two times a day    MEDICATIONS  (PRN):      Vital Signs Last 24 Hrs  T(C): 36.6 (15 Anuj 2018 06:00), Max: 36.7 (14 Jun 2018 08:01)  T(F): 97.9 (15 Anuj 2018 06:00), Max: 98.1 (14 Jun 2018 08:01)  HR: 77 (15 Anuj 2018 06:00) (70 - 101)  BP: 105/57 (15 Anuj 2018 06:00) (96/60 - 146/64)  BP(mean): 69 (15 Anuj 2018 06:00) (68 - 88)  RR: 17 (15 Anuj 2018 06:00) (13 - 25)  SpO2: 94% (14 Jun 2018 15:00) (94% - 99%)  CAPILLARY BLOOD GLUCOSE        I&O's Summary    13 Jun 2018 07:01  -  14 Jun 2018 07:00  --------------------------------------------------------  IN: 770 mL / OUT: 1550 mL / NET: -780 mL    14 Jun 2018 07:01  -  15 Anuj 2018 06:48  --------------------------------------------------------  IN: 350 mL / OUT: 500 mL / NET: -150 mL        PHYSICAL EXAM:  GENERAL: NAD, well-developed  HEAD: Atraumatic, Normocephalic  EYES: EOMI, conjunctiva and sclera clear  NECK: Supple, no LAD  CHEST/LUNG: Breath sounds CTA b/l; No wheeze  HEART: RRR, S1 and S2 present; No murmurs  ABDOMEN: Soft, NT, ND; + Bowel sounds  EXTREMITIES: No clubbing, cyanosis, or edema  VASCULAR: Peripheral pulses 2+ b/l  NEUROLOGY: Vision limited in upper L visual field, CNs otherwise intact, sensation intact to light touch throughout, strength 5/5 in all muscle groups assessed, no dysmetria  SKIN: No rashes or lesions    LABS:                        12.6   11.94 )-----------( 324      ( 15 Anuj 2018 06:00 )             38.2     WBC Trend: 11.94<--, 14.28<--, 18.34<--  06-15    133<L>  |  94<L>  |  19  ----------------------------<  112<H>  3.5   |  24  |  0.95    Ca    8.8      15 Anuj 2018 06:00  Phos  3.1     06-15  Mg     2.2     06-15    TPro  7.2  /  Alb  3.3  /  TBili  1.2  /  DBili  x   /  AST  41<H>  /  ALT  20  /  AlkPhos  114  06-15    Creatinine Trend: 0.95<--, 0.94<--, 0.65<--, 0.61<--    CARDIAC MARKERS ( 13 Jun 2018 11:05 )  x     / x     / 825 u/L / 59.49 ng/mL / x                RADIOLOGY & ADDITIONAL TESTS:    Imaging Personally Reviewed:    Consultant(s) Notes Reviewed:      Care Discussed with Consultants/Other Providers:    CONTACT #: [id number] Patient is a 72y old  Female who presents with a chief complaint of epigastric pain (13 Jun 2018 12:40)      SUBJECTIVE / OVERNIGHT EVENTS:  Patient seen and examined at bedside. No acute events overnight. C/o blurry vision w/ red and green floaters (L > R) this AM. Subsequently developed HA. Denies fever, light-headedness, SOB, CP, palpitations, nausea, and abdominal pain.    ROS:  See SUBJECTIVE / OVERNIGHT EVENTS    MEDICATIONS  (STANDING):  aspirin enteric coated 81 milliGRAM(s) Oral daily  atorvastatin 80 milliGRAM(s) Oral at bedtime  chlorhexidine 4% Liquid 1 Application(s) Topical <User Schedule>  lisinopril 5 milliGRAM(s) Oral daily  metoprolol succinate ER 25 milliGRAM(s) Oral daily  pantoprazole    Tablet 40 milliGRAM(s) Oral before breakfast  ticagrelor 90 milliGRAM(s) Oral two times a day    MEDICATIONS  (PRN):      Vital Signs Last 24 Hrs  T(C): 36.6 (15 Anuj 2018 06:00), Max: 36.7 (14 Jun 2018 08:01)  T(F): 97.9 (15 Anuj 2018 06:00), Max: 98.1 (14 Jun 2018 08:01)  HR: 77 (15 Anuj 2018 06:00) (70 - 101)  BP: 105/57 (15 Anuj 2018 06:00) (96/60 - 146/64)  BP(mean): 69 (15 Anuj 2018 06:00) (68 - 88)  RR: 17 (15 Anuj 2018 06:00) (13 - 25)  SpO2: 94% (14 Jun 2018 15:00) (94% - 99%)  CAPILLARY BLOOD GLUCOSE        I&O's Summary    13 Jun 2018 07:01  -  14 Jun 2018 07:00  --------------------------------------------------------  IN: 770 mL / OUT: 1550 mL / NET: -780 mL    14 Jun 2018 07:01  -  15 Anuj 2018 06:48  --------------------------------------------------------  IN: 350 mL / OUT: 500 mL / NET: -150 mL        PHYSICAL EXAM:  GENERAL: NAD, well-developed  HEAD: Atraumatic, Normocephalic  EYES: EOMI, conjunctiva and sclera clear  NECK: Supple, no LAD  CHEST/LUNG: Breath sounds CTA b/l; No wheeze  HEART: RRR, S1 and S2 present; No murmurs  ABDOMEN: Soft, NT, ND; + Bowel sounds  EXTREMITIES: No clubbing, cyanosis, or edema  VASCULAR: Peripheral pulses 2+ b/l  NEUROLOGY: Vision limited in upper L visual field, CNs otherwise intact, sensation intact to light touch throughout, strength 5/5 in all muscle groups assessed, no dysmetria  SKIN: No rashes or lesions    LABS:                        12.6   11.94 )-----------( 324      ( 15 Anuj 2018 06:00 )             38.2     WBC Trend: 11.94<--, 14.28<--, 18.34<--  06-15    133<L>  |  94<L>  |  19  ----------------------------<  112<H>  3.5   |  24  |  0.95    Ca    8.8      15 Anuj 2018 06:00  Phos  3.1     06-15  Mg     2.2     06-15    TPro  7.2  /  Alb  3.3  /  TBili  1.2  /  DBili  x   /  AST  41<H>  /  ALT  20  /  AlkPhos  114  06-15    Creatinine Trend: 0.95<--, 0.94<--, 0.65<--, 0.61<--    CARDIAC MARKERS ( 13 Jun 2018 11:05 )  x     / x     / 825 u/L / 59.49 ng/mL / x                RADIOLOGY & ADDITIONAL TESTS:    Imaging Personally Reviewed:    Consultant(s) Notes Reviewed:      Care Discussed with Consultants/Other Providers:    CONTACT #: [id number]

## 2018-06-15 NOTE — PROGRESS NOTE ADULT - PROBLEM SELECTOR PLAN 5
Likely in setting of recent cath  - monitor LFTs, presently downtrending  - avoid hepatotoxic agents Likely reactive in setting of cath; Patient afebrile and non-toxic in appearance  - Continue to monitor CBC, WBCs presently downtrending

## 2018-06-15 NOTE — PROGRESS NOTE ADULT - PROBLEM SELECTOR PLAN 8
DVT PPX: Holding pharmacological PPX in setting of recent cath; SCDs  Dispo: D/c to home today Patient has h/o lung ca s/p resection

## 2018-06-15 NOTE — PROGRESS NOTE ADULT - PROBLEM SELECTOR PLAN 2
Resolved  C/w protonix Underwent LHC, found to have 80% RCA occlusion s/p ALEK x2 to D1 on 6/12, s/p staged RCA PCI w/ ALEK x1 and balloon angioplasty on 6/14  C/w brilinta, asa, atorvastatin, and lisinopril  D/makeda lopressor, switched to toprol XL  TTE significant for EF 45-50%, mild segmental LV systolic dysfunction, and basal to mid septum hypokinesis  Cardiac monitoring  Serial EKG and Nik for recurrent CP  Monitor right wrist for bleeding, no bleeding at this time

## 2018-06-15 NOTE — PROGRESS NOTE ADULT - PROBLEM SELECTOR PLAN 9
DVT PPX: Holding pharmacological PPX in setting of recent cath; SCDs  Dispo: D/c to home today DVT PPX: Holding pharmacological PPX in setting of recent cath; SCDs  Dispo: D/c to home today if CTH WNL

## 2018-06-15 NOTE — PROGRESS NOTE ADULT - PROBLEM SELECTOR PLAN 6
C/w lisinopril  D/makeda lopressor, switched to toprol XL  Monitor BP, titrate antihypertensive regimen accordingly  Low Na diet Likely in setting of recent cath  - monitor LFTs, presently downtrending  - avoid hepatotoxic agents

## 2018-06-15 NOTE — PROGRESS NOTE ADULT - ATTENDING COMMENTS
Patient seen and examined. Agree with above assessment and plan  Staged PCI today  Cont ASA and Plavix  DC planning tomorrow
Agree with assessment and plan  Had vision changes last night that prompted ordering CTH  No further changes  No headache  Could be ocular migraine  DC planning

## 2018-06-15 NOTE — PROGRESS NOTE ADULT - PROBLEM SELECTOR PLAN 3
Patient hyponatremic during this admission  - Workup consistent w/ volume overload  - Monitor BMP, Na presently trending up Resolved  C/w protonix

## 2018-06-15 NOTE — PROGRESS NOTE ADULT - PROBLEM SELECTOR PLAN 4
Likely reactive in setting of cath; Patient afebrile and non-toxic in appearance  - Continue to monitor CBC, WBCs presently downtrending Patient hyponatremic during this admission  - Workup consistent w/ volume overload  - Monitor BMP, Na presently trending up

## 2018-07-03 ENCOUNTER — APPOINTMENT (OUTPATIENT)
Dept: CARDIOLOGY | Facility: CLINIC | Age: 73
End: 2018-07-03

## 2018-07-28 ENCOUNTER — EMERGENCY (EMERGENCY)
Facility: HOSPITAL | Age: 73
LOS: 1 days | Discharge: HOME | End: 2018-07-28
Attending: EMERGENCY MEDICINE | Admitting: EMERGENCY MEDICINE
Payer: MEDICAID

## 2018-07-28 VITALS
SYSTOLIC BLOOD PRESSURE: 166 MMHG | RESPIRATION RATE: 16 BRPM | DIASTOLIC BLOOD PRESSURE: 92 MMHG | TEMPERATURE: 98 F | OXYGEN SATURATION: 100 % | HEART RATE: 69 BPM

## 2018-07-28 VITALS
SYSTOLIC BLOOD PRESSURE: 188 MMHG | DIASTOLIC BLOOD PRESSURE: 88 MMHG | RESPIRATION RATE: 23 BRPM | HEART RATE: 66 BPM | TEMPERATURE: 97 F | OXYGEN SATURATION: 100 %

## 2018-07-28 DIAGNOSIS — Z98.89 OTHER SPECIFIED POSTPROCEDURAL STATES: Chronic | ICD-10-CM

## 2018-07-28 LAB
ALBUMIN SERPL ELPH-MCNC: 3.9 G/DL — SIGNIFICANT CHANGE UP (ref 3.3–5)
ALP SERPL-CCNC: 134 U/L — HIGH (ref 40–120)
ALT FLD-CCNC: 17 U/L — SIGNIFICANT CHANGE UP (ref 4–33)
APPEARANCE UR: CLEAR — SIGNIFICANT CHANGE UP
APTT BLD: 36.9 SEC — SIGNIFICANT CHANGE UP (ref 27.5–37.4)
AST SERPL-CCNC: 23 U/L — SIGNIFICANT CHANGE UP (ref 4–32)
BASOPHILS # BLD AUTO: 0.04 K/UL — SIGNIFICANT CHANGE UP (ref 0–0.2)
BASOPHILS NFR BLD AUTO: 0.4 % — SIGNIFICANT CHANGE UP (ref 0–2)
BILIRUB SERPL-MCNC: 0.8 MG/DL — SIGNIFICANT CHANGE UP (ref 0.2–1.2)
BILIRUB UR-MCNC: NEGATIVE — SIGNIFICANT CHANGE UP
BLOOD UR QL VISUAL: NEGATIVE — SIGNIFICANT CHANGE UP
BUN SERPL-MCNC: 9 MG/DL — SIGNIFICANT CHANGE UP (ref 7–23)
CALCIUM SERPL-MCNC: 9.6 MG/DL — SIGNIFICANT CHANGE UP (ref 8.4–10.5)
CHLORIDE SERPL-SCNC: 101 MMOL/L — SIGNIFICANT CHANGE UP (ref 98–107)
CO2 SERPL-SCNC: 23 MMOL/L — SIGNIFICANT CHANGE UP (ref 22–31)
COLOR SPEC: YELLOW — SIGNIFICANT CHANGE UP
CREAT SERPL-MCNC: 0.73 MG/DL — SIGNIFICANT CHANGE UP (ref 0.5–1.3)
EOSINOPHIL # BLD AUTO: 0.26 K/UL — SIGNIFICANT CHANGE UP (ref 0–0.5)
EOSINOPHIL NFR BLD AUTO: 2.7 % — SIGNIFICANT CHANGE UP (ref 0–6)
ERYTHROCYTE [SEDIMENTATION RATE] IN BLOOD: 62 MM/HR — HIGH (ref 4–25)
GLUCOSE SERPL-MCNC: 104 MG/DL — HIGH (ref 70–99)
GLUCOSE UR-MCNC: NEGATIVE — SIGNIFICANT CHANGE UP
HCT VFR BLD CALC: 40.4 % — SIGNIFICANT CHANGE UP (ref 34.5–45)
HGB BLD-MCNC: 13 G/DL — SIGNIFICANT CHANGE UP (ref 11.5–15.5)
IMM GRANULOCYTES # BLD AUTO: 0.02 # — SIGNIFICANT CHANGE UP
IMM GRANULOCYTES NFR BLD AUTO: 0.2 % — SIGNIFICANT CHANGE UP (ref 0–1.5)
INR BLD: 1.02 — SIGNIFICANT CHANGE UP (ref 0.88–1.17)
KETONES UR-MCNC: NEGATIVE — SIGNIFICANT CHANGE UP
LEUKOCYTE ESTERASE UR-ACNC: NEGATIVE — SIGNIFICANT CHANGE UP
LYMPHOCYTES # BLD AUTO: 1.74 K/UL — SIGNIFICANT CHANGE UP (ref 1–3.3)
LYMPHOCYTES # BLD AUTO: 18.3 % — SIGNIFICANT CHANGE UP (ref 13–44)
MCHC RBC-ENTMCNC: 26.5 PG — LOW (ref 27–34)
MCHC RBC-ENTMCNC: 32.2 % — SIGNIFICANT CHANGE UP (ref 32–36)
MCV RBC AUTO: 82.4 FL — SIGNIFICANT CHANGE UP (ref 80–100)
MONOCYTES # BLD AUTO: 0.76 K/UL — SIGNIFICANT CHANGE UP (ref 0–0.9)
MONOCYTES NFR BLD AUTO: 8 % — SIGNIFICANT CHANGE UP (ref 2–14)
MUCOUS THREADS # UR AUTO: SIGNIFICANT CHANGE UP
NEUTROPHILS # BLD AUTO: 6.7 K/UL — SIGNIFICANT CHANGE UP (ref 1.8–7.4)
NEUTROPHILS NFR BLD AUTO: 70.4 % — SIGNIFICANT CHANGE UP (ref 43–77)
NITRITE UR-MCNC: NEGATIVE — SIGNIFICANT CHANGE UP
NRBC # FLD: 0 — SIGNIFICANT CHANGE UP
PH UR: 7 — SIGNIFICANT CHANGE UP (ref 4.6–8)
PLATELET # BLD AUTO: 303 K/UL — SIGNIFICANT CHANGE UP (ref 150–400)
PMV BLD: 10.4 FL — SIGNIFICANT CHANGE UP (ref 7–13)
POTASSIUM SERPL-MCNC: 4.2 MMOL/L — SIGNIFICANT CHANGE UP (ref 3.5–5.3)
POTASSIUM SERPL-SCNC: 4.2 MMOL/L — SIGNIFICANT CHANGE UP (ref 3.5–5.3)
PROT SERPL-MCNC: 8.3 G/DL — SIGNIFICANT CHANGE UP (ref 6–8.3)
PROT UR-MCNC: NEGATIVE MG/DL — SIGNIFICANT CHANGE UP
PROTHROM AB SERPL-ACNC: 11.7 SEC — SIGNIFICANT CHANGE UP (ref 9.8–13.1)
RBC # BLD: 4.9 M/UL — SIGNIFICANT CHANGE UP (ref 3.8–5.2)
RBC # FLD: 16 % — HIGH (ref 10.3–14.5)
RBC CASTS # UR COMP ASSIST: SIGNIFICANT CHANGE UP (ref 0–?)
SODIUM SERPL-SCNC: 139 MMOL/L — SIGNIFICANT CHANGE UP (ref 135–145)
SP GR SPEC: 1.01 — SIGNIFICANT CHANGE UP (ref 1–1.04)
SQUAMOUS # UR AUTO: SIGNIFICANT CHANGE UP
TROPONIN T, HIGH SENSITIVITY: 22 NG/L — SIGNIFICANT CHANGE UP (ref ?–14)
TROPONIN T, HIGH SENSITIVITY: 22 NG/L — SIGNIFICANT CHANGE UP (ref ?–14)
UROBILINOGEN FLD QL: NORMAL MG/DL — SIGNIFICANT CHANGE UP
WBC # BLD: 9.52 K/UL — SIGNIFICANT CHANGE UP (ref 3.8–10.5)
WBC # FLD AUTO: 9.52 K/UL — SIGNIFICANT CHANGE UP (ref 3.8–10.5)
WBC UR QL: SIGNIFICANT CHANGE UP (ref 0–?)

## 2018-07-28 PROCEDURE — 99284 EMERGENCY DEPT VISIT MOD MDM: CPT | Mod: 25

## 2018-07-28 PROCEDURE — 93010 ELECTROCARDIOGRAM REPORT: CPT

## 2018-07-28 RX ORDER — METOCLOPRAMIDE HCL 10 MG
10 TABLET ORAL ONCE
Qty: 0 | Refills: 0 | Status: COMPLETED | OUTPATIENT
Start: 2018-07-28 | End: 2018-07-28

## 2018-07-28 RX ORDER — ACETAMINOPHEN 500 MG
650 TABLET ORAL ONCE
Qty: 0 | Refills: 0 | Status: COMPLETED | OUTPATIENT
Start: 2018-07-28 | End: 2018-07-28

## 2018-07-28 RX ORDER — DIAZEPAM 5 MG
2 TABLET ORAL ONCE
Qty: 0 | Refills: 0 | Status: DISCONTINUED | OUTPATIENT
Start: 2018-07-28 | End: 2018-07-28

## 2018-07-28 RX ORDER — SODIUM CHLORIDE 9 MG/ML
1000 INJECTION INTRAMUSCULAR; INTRAVENOUS; SUBCUTANEOUS ONCE
Qty: 0 | Refills: 0 | Status: COMPLETED | OUTPATIENT
Start: 2018-07-28 | End: 2018-07-28

## 2018-07-28 RX ORDER — IBUPROFEN 200 MG
400 TABLET ORAL ONCE
Qty: 0 | Refills: 0 | Status: COMPLETED | OUTPATIENT
Start: 2018-07-28 | End: 2018-07-28

## 2018-07-28 RX ADMIN — Medication 650 MILLIGRAM(S): at 11:32

## 2018-07-28 RX ADMIN — Medication 2 MILLIGRAM(S): at 11:32

## 2018-07-28 RX ADMIN — Medication 400 MILLIGRAM(S): at 11:32

## 2018-07-28 RX ADMIN — SODIUM CHLORIDE 1000 MILLILITER(S): 9 INJECTION INTRAMUSCULAR; INTRAVENOUS; SUBCUTANEOUS at 11:59

## 2018-07-28 RX ADMIN — Medication 10 MILLIGRAM(S): at 11:32

## 2018-07-28 NOTE — ED ADULT TRIAGE NOTE - CHIEF COMPLAINT QUOTE
Pt c/o headache, neck pain and chills x 5 days.  Denies blurry vision.  Pt s/p cardiac cath 1 mth ago

## 2018-07-28 NOTE — ED PROVIDER NOTE - PHYSICAL EXAMINATION
PGY1/MD Heather. PGY1/MD Heather.    HEENT: Neck stiffness, localized on her right neck. No bruit sounds of carotid artery. No temporal artery tender to palpation. PGY1/MD Heather.    HEENT: Neck tenderness, localized on her right neck. No bruit sounds of carotid artery. No temporal artery tender to palpation.

## 2018-07-28 NOTE — ED PROVIDER NOTE - OBJECTIVE STATEMENT
PGY1/MD Heather. 74 yo F with PMH of CAD c/o headache, neck pain. She feels constant, pressure pain on her right side neck and right posterior head. It started 7 days ago, then gets worsening, 10/10 intensity. Ibupurofen does not relieve her pain. She had light headed and nausea/vomiting 4 days ago but she denies nausea today. She occasionally feels numbness on her right leg but denies difficulty speaking/walking. She has had SOB and so lost her physical activity. Also, dizziness and lightheaded made her stay inactive. No chest pain. PGY1/MD Heather. 72 yo F with PMH of CAD c/o headache, neck pain. She feels constant, pressure pain on her right side neck and right posterior head. It started 7 days ago, then gets worsening, 10/10 intensity. Ibupurofen does not relieve her pain. She had light headed and nausea/vomiting 4 days ago but she denies nausea today. She occasionally feels numbness on her right leg but denies difficulty speaking/walking. She has had SOB and so lost her physical activity. Also, dizziness and lightheaded made her stay inactive. No chest pain. Denies change in vision acuity but admits that she has blurry vision when she has a headache. PGY1/MD Heather. 74 yo F with PMH of CAD c/o headache, neck pain. She feels constant, pressure pain on her right side neck and right posterior head. It started 7 days ago, then gets worsening, 10/10 intensity. Ibupurofen and tylenol were helpful. She had light headed and nausea/vomiting 4 days ago which is now completely resolved. She occasionally feels numbness on her right leg but denies difficulty speaking/walking. She has had SOB and endorses some fatigue. No chest pain. Denies change in vision acuity but admits that she has blurry vision when she has a headache.

## 2018-07-28 NOTE — ED PROVIDER NOTE - PROGRESS NOTE DETAILS
/90 probably due to headache/stress. Blurry vision with headache, needs evaluation of TA with checking ESR. Tension headache suspected, see response from ibu/tylenol/metochro. On EKG, T wave inversion on lateral leads which was not seen in EKG in [facility] but she is s/p PCI, Trop is negative, /90 probably due to headache/stress. Blurry vision with headache, needs evaluation of TA with checking ESR. Tension headache suspected, see response from ibu/tylenol/metochro. She endorses not eating well, lost some weight. 1L saline is given for her dehydration. On EKG, T wave inversion on lateral leads which was not seen in EKG in [facility] but she is s/p PCI, Trop is negative, no chest pain. No signs suggesting ACS. After 1L saline is given, she does not complain any SOB, feeling even better. No chest Xp evaluation is required.

## 2018-07-28 NOTE — ED PROVIDER NOTE - ATTENDING CONTRIBUTION TO CARE
73F h/o CAD, cath and stent last month presents from home with family for right sided neck pain. Pain started 5 days ago, gradual, persistent, similar to previous pain in the past. Improves with tylenol and motrin. Had some nausea that has resolved. Patient denies vision changes, focal weakness/numbness,  fever, cough, chest pain, shortness of breath, back pain, abd pain, nausea, vomiting, diarrhea, constipation, blood in stool, dysuria, rash, trauma, falls. Patient is well appearing, conversant, cooperative, smiling, head atraumatic, neck supple with full range of motion, no carotid bruits, no spinal tenderness, positive tenderness along SCM muscle on right side, oropharynx clear, lungs clear, no crackles or rales, speaking full sentences, heart clear, no murmurs, distal pulses equal in all 4 extremities, abdomen soft nontender nondistended with no masses, no leg edema, no calf tenderness, nonfocal neurologic exam. No acute signs of trauma, infection, sepsis, toxidrome, intoxication.    The patient did not have the history or physical of a Subarachnoid Hemorrhage (no FH of SAH, no connective tissue disease, polycystic kidney disease, pregnancy, smoking). There is no indication for Lumbar Puncture. Risks are greater than the benefits for this procedure. In addition, there are no risk factors such as anticoagulants that would be a risk for a Cerebral hemorrhage.  No signs of a CNS lesion.  No prior neurosurgery, congenital disease, malignancy, pregnancy, congenital disease, or immunocompromised state.  There are no risk factors for Pseudotumor Cerebri (not obese, pregnant, on new antibiotics or oral contraceptives).  There are no signs of an infectious process like Meningitis or Encephalitis.  There are no meningeal signs. There are no signs of Temporal Arteritis, Carotid or Vertebral Dissection, Cavernous Sinus Thrombosis, Acute Glaucoma, Carbon Monoxide, or Hypertensive Crisis.     Patient stable. Likely muscle spasm along sternocleidomastoid. Analgesia. Fluids. Reassess.

## 2018-07-28 NOTE — ED PROVIDER NOTE - NS ED ROS FT
PGY1/MD Heather.     Constitutional: no fevers, no chills.  Eyes: no visual changes but has blurry vision when she has headache.  Ears: no ear drainage, no ear pain.  Nose: no nasal congestion.  Mouth/Throat: no sore throat.  Cardiovascular: no chest pain  Respiratory: occasional shortness of breath, no wheezing, no cough  Gastrointestinal: nausea, vomiting, but no diarrhea or abdominal pain.  Skin: no rashes. laceration on her right leg  Neuro: not available to walk because of light headed and severe headache.  Psychiatric: no known mental health issues.

## 2018-07-28 NOTE — ED ADULT NURSE NOTE - CHPI ED SYMPTOMS NEG
no confusion/no change in level of consciousness/no fever/no blurred vision/no loss of consciousness/no numbness

## 2018-07-28 NOTE — ED PROVIDER NOTE - NEUROLOGICAL, MLM
Alert and oriented, no focal deficits, no motor or sensory deficits. CNS=grossly intact Alert and oriented, no focal deficits, no motor or sensory deficits. CNS=grossly intact. Finger to nose test normal. Alert and oriented, no focal deficits, no motor or sensory deficits. CN 2-12 intact. Finger to nose test normal bilaterally. No tenderness along temporal artery bilaterally.

## 2018-07-28 NOTE — ED PROVIDER NOTE - FAMILY HISTORY
Mother  Still living? No  Family history of migraine headaches, Age at diagnosis: Age Unknown  Family history of heart attack, Age at diagnosis: Age Unknown

## 2018-07-28 NOTE — ED ADULT NURSE NOTE - OBJECTIVE STATEMENT
Patient reports headache/neck pain that began approx 5 days ago, and worsened this morning. Patient took ibuprofen with no relief of symptoms. Patient reports dizziness, generalized weakness "and feeling unwell." Patient denies any CP or SOB. No deformity, bleeding or bruising noted in neck or head area. Patient denies any recent falls. Patient also reports recent outbreak of sores, patient currently has open sore on bottom lip. Patient had cardiac cath placed one month ago, patient denies any fever/chills, back or abdominal pain. Patient is currently a&ox4, able to ambulate as well as baseline. Patient denies any bladder/bowel issues, numbness/tingling in any extremities. Patient's labs drawn, awaiting further MD evaluation.

## 2018-07-28 NOTE — ED PROVIDER NOTE - ENMT, MLM
Airway patent, Nasal mucosa clear. Mouth with normal mucosa. Throat has no vesicles, no oropharyngeal exudates and uvula is midline. Airway patent, Nasal mucosa clear. Mouth with normal mucosa. Throat has no vesicles, no oropharyngeal exudates and uvula is midline. Bilateral ear canals are clear. Bilateral tympanic membranes are pearly.

## 2018-07-28 NOTE — ED PROVIDER NOTE - MEDICAL DECISION MAKING DETAILS
No neurological focal signs, neck stiffness that suggests meningeal irritation, pain relieved by ibuprofen/tylenol/metochropropamide, no strong evidence supporting subarachnoid hemorrhage, intracranial bleed, meningitis, encephalitis. She had head CT scan 1 month ago with no signs of intracranial mass. No temporal artery tenderness, no decreased vision acuity, ESR is below 79 (her age), therefore temporal arteritis is not a diagnosis. She endorses tender to right SCM, radiates to her posterior head, muscle cramp is the primary concern. No neurological focal signs, neck stiffness that suggests meningeal irritation, pain relieved by ibuprofen/tylenol/metochropropamide, no strong evidence supporting subarachnoid hemorrhage, intracranial bleed, meningitis, encephalitis. She had head CT scan 1 month ago with no signs of intracranial mass. No temporal artery tenderness, no decreased vision acuity, ESR is below 79 (her age), therefore temporal arteritis is delined. She endorses tender to right SCM, radiates to her posterior head, muscle cramp is the primary concern. Ibuprofen/tylenol/metochropropamide relived her and she recovered her appetite, BP improved. Dehydration was addressed with given 1L saline. She is feeling well and can walk straight.    Patient was given strict return and follow up precautions with worsen neurological signs, such as repeated vomiting, altered mental status. The patient has been informed of all concerning signs and symptoms to return to Emergency Department, the necessity to follow up with PMD follow up provided within 2-3 days was explained, and the patient reports understanding of above with capacity and insight with translation by her family.

## 2023-02-07 NOTE — ED PROVIDER NOTE - PMH
HTN (hypertension)    Lung mass VITAL SIGNS: I have reviewed nursing notes and confirm.  CONSTITUTIONAL: Well-appearing, non-toxic, in NAD  SKIN: Warm dry, normal skin turgor  HEAD: NCAT  EYES: No conjunctival injection, scleral anicteric  ENT: Moist mucous membranes, normal pharynx with no erythema or exudates  NECK: Supple; full ROM. Nontender. No cervical LAD  CARD: RRR, no murmurs, rubs or gallops  RESP: Clear to ausculation bilaterally.  No rales, rhonchi, or wheezing.  ABD: Soft, distended, non-tender, no rebound or guarding. No CVA tenderness  EXT: Full ROM, no bony tenderness, no pedal edema, no calf tenderness  NEURO: Normal motor, normal sensory. CN II-XII grossly intact. Cerebellar testing normal. Normal gait.  PSYCH: Cooperative, appropriate.

## 2023-04-18 NOTE — ED PROVIDER NOTE - ATTENDING CONTRIBUTION TO CARE
Cherrie Khoury is a 62 y o  female who is currently ordered Vancomycin IV with management by the Pharmacy Consult service  Relevant clinical data and objective / subjective history reviewed  Vancomycin Assessment:  Indication and Goal AUC/Trough: Soft tissue (goal -600, trough >10)  Clinical Status: stable  Micro:   pending  Renal Function:  SCr: 1 31 mg/dL  CrCl: 41 8 mL/min  Renal replacement: Not on dialysis  Days of Therapy: 1  Current Dose: 500 mg q12h  Vancomycin Plan:  New Dosing:    Estimated AUC: 416 mcg*hr/mL  Estimated Trough: 14 3 mcg/mL  Next Level: 04/20/23 at 04:00  Renal Function Monitoring: Daily BMP and [place] will continue to follow closely for s/sx of nephrotoxicity, infusion reactions and appropriateness of therapy  BMP and CBC will be ordered per protocol  We will continue to follow the patient’s culture results and clinical progress daily      Andriy Gilliam, Pharmacist I was physically present for the E/M service provided. I agree with above history, physical, and plan which I have reviewed and edited where appropriate. I was physically present for the key portions of the service provided.